# Patient Record
Sex: FEMALE | Race: WHITE | URBAN - METROPOLITAN AREA
[De-identification: names, ages, dates, MRNs, and addresses within clinical notes are randomized per-mention and may not be internally consistent; named-entity substitution may affect disease eponyms.]

---

## 2018-10-12 ENCOUNTER — APPOINTMENT (RX ONLY)
Dept: URBAN - METROPOLITAN AREA CLINIC 148 | Facility: CLINIC | Age: 78
Setting detail: DERMATOLOGY
End: 2018-10-12

## 2018-10-12 DIAGNOSIS — D22 MELANOCYTIC NEVI: ICD-10-CM

## 2018-10-12 DIAGNOSIS — D18.0 HEMANGIOMA: ICD-10-CM

## 2018-10-12 DIAGNOSIS — L81.4 OTHER MELANIN HYPERPIGMENTATION: ICD-10-CM

## 2018-10-12 DIAGNOSIS — B07.8 OTHER VIRAL WARTS: ICD-10-CM

## 2018-10-12 DIAGNOSIS — L28.1 PRURIGO NODULARIS: ICD-10-CM

## 2018-10-12 DIAGNOSIS — Z85.828 PERSONAL HISTORY OF OTHER MALIGNANT NEOPLASM OF SKIN: ICD-10-CM

## 2018-10-12 DIAGNOSIS — L57.0 ACTINIC KERATOSIS: ICD-10-CM

## 2018-10-12 DIAGNOSIS — D485 NEOPLASM OF UNCERTAIN BEHAVIOR OF SKIN: ICD-10-CM

## 2018-10-12 DIAGNOSIS — L82.1 OTHER SEBORRHEIC KERATOSIS: ICD-10-CM

## 2018-10-12 DIAGNOSIS — Z87.2 PERSONAL HISTORY OF DISEASES OF THE SKIN AND SUBCUTANEOUS TISSUE: ICD-10-CM

## 2018-10-12 PROBLEM — D18.01 HEMANGIOMA OF SKIN AND SUBCUTANEOUS TISSUE: Status: ACTIVE | Noted: 2018-10-12

## 2018-10-12 PROBLEM — D22.62 MELANOCYTIC NEVI OF LEFT UPPER LIMB, INCLUDING SHOULDER: Status: ACTIVE | Noted: 2018-10-12

## 2018-10-12 PROBLEM — E03.9 HYPOTHYROIDISM, UNSPECIFIED: Status: ACTIVE | Noted: 2018-10-12

## 2018-10-12 PROBLEM — E78.5 HYPERLIPIDEMIA, UNSPECIFIED: Status: ACTIVE | Noted: 2018-10-12

## 2018-10-12 PROBLEM — K21.9 GASTRO-ESOPHAGEAL REFLUX DISEASE WITHOUT ESOPHAGITIS: Status: ACTIVE | Noted: 2018-10-12

## 2018-10-12 PROBLEM — D48.5 NEOPLASM OF UNCERTAIN BEHAVIOR OF SKIN: Status: ACTIVE | Noted: 2018-10-12

## 2018-10-12 PROBLEM — I10 ESSENTIAL (PRIMARY) HYPERTENSION: Status: ACTIVE | Noted: 2018-10-12

## 2018-10-12 PROBLEM — M12.9 ARTHROPATHY, UNSPECIFIED: Status: ACTIVE | Noted: 2018-10-12

## 2018-10-12 PROBLEM — D22.61 MELANOCYTIC NEVI OF RIGHT UPPER LIMB, INCLUDING SHOULDER: Status: ACTIVE | Noted: 2018-10-12

## 2018-10-12 PROBLEM — F41.9 ANXIETY DISORDER, UNSPECIFIED: Status: ACTIVE | Noted: 2018-10-12

## 2018-10-12 PROBLEM — D22.5 MELANOCYTIC NEVI OF TRUNK: Status: ACTIVE | Noted: 2018-10-12

## 2018-10-12 PROCEDURE — 11100: CPT | Mod: 59

## 2018-10-12 PROCEDURE — 11101: CPT

## 2018-10-12 PROCEDURE — ? COUNSELING

## 2018-10-12 PROCEDURE — ? LIQUID NITROGEN

## 2018-10-12 PROCEDURE — ? SUNSCREEN RECOMMENDATIONS

## 2018-10-12 PROCEDURE — ? BIOPSY BY SHAVE METHOD

## 2018-10-12 PROCEDURE — ? PRESCRIPTION

## 2018-10-12 PROCEDURE — 17000 DESTRUCT PREMALG LESION: CPT

## 2018-10-12 PROCEDURE — 99214 OFFICE O/P EST MOD 30 MIN: CPT | Mod: 25

## 2018-10-12 RX ORDER — BETAMETHASONE DIPROPIONATE 0.5 MG/G
CREAM TOPICAL
Qty: 1 | Refills: 0 | Status: ERX | COMMUNITY
Start: 2018-10-12

## 2018-10-12 RX ADMIN — BETAMETHASONE DIPROPIONATE: 0.5 CREAM TOPICAL at 00:00

## 2018-10-12 ASSESSMENT — LOCATION DETAILED DESCRIPTION DERM
LOCATION DETAILED: RIGHT ANTERIOR DISTAL UPPER ARM
LOCATION DETAILED: RIGHT MID-UPPER BACK
LOCATION DETAILED: INFERIOR THORACIC SPINE
LOCATION DETAILED: LEFT PROXIMAL DORSAL FOREARM
LOCATION DETAILED: LEFT ANTERIOR LATERAL DISTAL UPPER ARM
LOCATION DETAILED: RIGHT PROXIMAL POSTERIOR UPPER ARM
LOCATION DETAILED: RIGHT SUPERIOR UPPER BACK
LOCATION DETAILED: LEFT ELBOW
LOCATION DETAILED: RIGHT ANTERIOR PROXIMAL UPPER ARM
LOCATION DETAILED: RIGHT PROXIMAL RADIAL DORSAL FOREARM
LOCATION DETAILED: RIGHT SUPERIOR MEDIAL UPPER BACK
LOCATION DETAILED: UPPER STERNUM
LOCATION DETAILED: LEFT PROXIMAL POSTERIOR UPPER ARM
LOCATION DETAILED: RIGHT PROXIMAL DORSAL FOREARM
LOCATION DETAILED: LEFT SUPERIOR UPPER BACK
LOCATION DETAILED: LEFT ANTERIOR PROXIMAL THIGH
LOCATION DETAILED: RIGHT INFERIOR UPPER BACK

## 2018-10-12 ASSESSMENT — LOCATION SIMPLE DESCRIPTION DERM
LOCATION SIMPLE: RIGHT POSTERIOR UPPER ARM
LOCATION SIMPLE: LEFT UPPER ARM
LOCATION SIMPLE: RIGHT UPPER BACK
LOCATION SIMPLE: LEFT FOREARM
LOCATION SIMPLE: RIGHT FOREARM
LOCATION SIMPLE: LEFT UPPER BACK
LOCATION SIMPLE: CHEST
LOCATION SIMPLE: UPPER BACK
LOCATION SIMPLE: RIGHT UPPER ARM
LOCATION SIMPLE: LEFT ELBOW
LOCATION SIMPLE: LEFT POSTERIOR UPPER ARM
LOCATION SIMPLE: LEFT THIGH

## 2018-10-12 ASSESSMENT — LOCATION ZONE DERM
LOCATION ZONE: TRUNK
LOCATION ZONE: ARM
LOCATION ZONE: LEG

## 2018-10-12 NOTE — PROCEDURE: BIOPSY BY SHAVE METHOD
Anesthesia Type: 1% lidocaine with epinephrine
Electrodesiccation Text: The wound bed was treated with electrodesiccation after the biopsy was performed.
Post-Care Instructions: I reviewed with the patient in detail post-care instructions. Patient is to keep the biopsy site dry overnight, and then apply vaseline with bandaid twice daily until healed.
Detail Level: Detailed
Biopsy Method: Dermablade
Size Of Lesion In Cm: 0
Destruction After The Procedure: No
Consent: Written consent was obtained and risks were reviewed including but not limited to scarring, infection, bleeding, scabbing, incomplete removal, nerve damage and allergy to anesthesia.
Silver Nitrate Text: The wound bed was treated with silver nitrate after the biopsy was performed.
Billing Type: Third-Party Bill
Biopsy Type: H and E
Curettage Text: The wound bed was treated with curettage after the biopsy was performed.
Electrodesiccation And Curettage Text: The wound bed was treated with electrodesiccation and curettage after the biopsy was performed.
Dressing: bandage
Notification Instructions: Patient will be notified of biopsy results. However, patient instructed to call the office if not contacted within 2 weeks.
Type Of Destruction Used: Curettage
Hemostasis: Aluminum Chloride
Wound Care: Vaseline
Lab: 6
Depth Of Biopsy: dermis
Was A Bandage Applied: Yes
Anesthesia Volume In Cc (Will Not Render If 0): 0.5
Cryotherapy Text: The wound bed was treated with cryotherapy after the biopsy was performed.

## 2018-10-12 NOTE — PROCEDURE: LIQUID NITROGEN
Consent: The patient's consent was obtained including but not limited to risks of crusting, scabbing, blistering, scarring, darker or lighter pigmentary change, recurrence, incomplete removal and infection.
Number Of Freeze-Thaw Cycles: 2 freeze-thaw cycles
Render Post-Care Instructions In Note?: no
Post-Care Instructions: I reviewed with the patient in detail post-care instructions. Patient is to wear sunprotection, and avoid picking at any of the treated lesions. Pt may apply Vaseline to crusted or scabbing areas.
Detail Level: Zone
Duration Of Freeze Thaw-Cycle (Seconds): 3

## 2018-10-12 NOTE — PROCEDURE: SUNSCREEN RECOMMENDATIONS
Detail Level: Detailed
Products Recommended: recommended Neutragena Baby Faces stick with titanium dioxide for the face and Vanicream products of the body
General Sunscreen Counseling: I recommended a broad spectrum sunscreen with a SPF of 30 or higher.  I explained that SPF 30 sunscreens block approximately 97 percent of the sun's harmful rays.  Sunscreens should be applied at least 15 minutes prior to expected sun exposure and then every 2 hours after that as long as sun exposure continues. If swimming or exercising sunscreen should be reapplied every 45 minutes to an hour after getting wet or sweating.  One ounce, or the equivalent of a shot glass full of sunscreen, is adequate to protect the skin not covered by a bathing suit. I also recommended a lip balm with a sunscreen as well. Sun protective clothing can be used in lieu of sunscreen but must be worn the entire time you are exposed to the sun's rays.

## 2018-11-02 ENCOUNTER — APPOINTMENT (RX ONLY)
Dept: URBAN - METROPOLITAN AREA CLINIC 148 | Facility: CLINIC | Age: 78
Setting detail: DERMATOLOGY
End: 2018-11-02

## 2018-11-02 DIAGNOSIS — L57.0 ACTINIC KERATOSIS: ICD-10-CM

## 2018-11-02 DIAGNOSIS — L81.4 OTHER MELANIN HYPERPIGMENTATION: ICD-10-CM

## 2018-11-02 PROBLEM — L55.1 SUNBURN OF SECOND DEGREE: Status: ACTIVE | Noted: 2018-11-02

## 2018-11-02 PROBLEM — L85.3 XEROSIS CUTIS: Status: ACTIVE | Noted: 2018-11-02

## 2018-11-02 PROCEDURE — 99213 OFFICE O/P EST LOW 20 MIN: CPT | Mod: 25

## 2018-11-02 PROCEDURE — ? COUNSELING

## 2018-11-02 PROCEDURE — ? LIQUID NITROGEN

## 2018-11-02 PROCEDURE — ? SUNSCREEN RECOMMENDATIONS

## 2018-11-02 PROCEDURE — 17004 DESTROY PREMAL LESIONS 15/>: CPT

## 2018-11-02 PROCEDURE — ? PATHOLOGY DISCUSSION

## 2018-11-02 ASSESSMENT — LOCATION DETAILED DESCRIPTION DERM
LOCATION DETAILED: RIGHT MEDIAL SUPERIOR CHEST
LOCATION DETAILED: RIGHT ANTERIOR PROXIMAL UPPER ARM
LOCATION DETAILED: LEFT POSTERIOR SHOULDER
LOCATION DETAILED: LEFT ANTERIOR LATERAL DISTAL UPPER ARM
LOCATION DETAILED: RIGHT INFERIOR LATERAL MALAR CHEEK
LOCATION DETAILED: RIGHT LATERAL TRAPEZIAL NECK
LOCATION DETAILED: LEFT ANTERIOR DISTAL UPPER ARM
LOCATION DETAILED: RIGHT DISTAL DORSAL FOREARM
LOCATION DETAILED: RIGHT ELBOW
LOCATION DETAILED: LEFT ANTERIOR SHOULDER
LOCATION DETAILED: RIGHT PROXIMAL DORSAL FOREARM
LOCATION DETAILED: RIGHT POSTERIOR SHOULDER
LOCATION DETAILED: LEFT MEDIAL SUPERIOR CHEST
LOCATION DETAILED: LEFT ELBOW
LOCATION DETAILED: LEFT LATERAL SUPERIOR CHEST
LOCATION DETAILED: LEFT VENTRAL PROXIMAL FOREARM
LOCATION DETAILED: LEFT ANTERIOR PROXIMAL THIGH
LOCATION DETAILED: RIGHT VENTRAL PROXIMAL FOREARM

## 2018-11-02 ASSESSMENT — LOCATION SIMPLE DESCRIPTION DERM
LOCATION SIMPLE: RIGHT FOREARM
LOCATION SIMPLE: RIGHT ELBOW
LOCATION SIMPLE: LEFT THIGH
LOCATION SIMPLE: LEFT ELBOW
LOCATION SIMPLE: LEFT UPPER ARM
LOCATION SIMPLE: LEFT FOREARM
LOCATION SIMPLE: LEFT SHOULDER
LOCATION SIMPLE: RIGHT UPPER ARM
LOCATION SIMPLE: POSTERIOR NECK
LOCATION SIMPLE: RIGHT SHOULDER
LOCATION SIMPLE: CHEST
LOCATION SIMPLE: RIGHT CHEEK

## 2018-11-02 ASSESSMENT — LOCATION ZONE DERM
LOCATION ZONE: FACE
LOCATION ZONE: LEG
LOCATION ZONE: ARM
LOCATION ZONE: TRUNK
LOCATION ZONE: NECK

## 2018-11-02 NOTE — PROCEDURE: LIQUID NITROGEN
Post-Care Instructions: I reviewed with the patient in detail post-care instructions. Patient is to wear sunprotection, and avoid picking at any of the treated lesions. Pt may apply Vaseline to crusted or scabbing areas.
Number Of Freeze-Thaw Cycles: 2 freeze-thaw cycles
Detail Level: Simple
Consent: The patient's consent was obtained including but not limited to risks of crusting, scabbing, blistering, scarring, darker or lighter pigmentary change, recurrence, incomplete removal and infection.
Duration Of Freeze Thaw-Cycle (Seconds): 2
Render Post-Care Instructions In Note?: no

## 2020-02-05 ENCOUNTER — APPOINTMENT (RX ONLY)
Dept: URBAN - METROPOLITAN AREA CLINIC 148 | Facility: CLINIC | Age: 80
Setting detail: DERMATOLOGY
End: 2020-02-05

## 2020-02-05 DIAGNOSIS — D22 MELANOCYTIC NEVI: ICD-10-CM

## 2020-02-05 DIAGNOSIS — B07.8 OTHER VIRAL WARTS: ICD-10-CM

## 2020-02-05 DIAGNOSIS — Z87.2 PERSONAL HISTORY OF DISEASES OF THE SKIN AND SUBCUTANEOUS TISSUE: ICD-10-CM

## 2020-02-05 DIAGNOSIS — L81.4 OTHER MELANIN HYPERPIGMENTATION: ICD-10-CM

## 2020-02-05 DIAGNOSIS — L57.0 ACTINIC KERATOSIS: ICD-10-CM

## 2020-02-05 DIAGNOSIS — L82.1 OTHER SEBORRHEIC KERATOSIS: ICD-10-CM

## 2020-02-05 DIAGNOSIS — L70.8 OTHER ACNE: ICD-10-CM

## 2020-02-05 DIAGNOSIS — D18.0 HEMANGIOMA: ICD-10-CM

## 2020-02-05 DIAGNOSIS — Z85.828 PERSONAL HISTORY OF OTHER MALIGNANT NEOPLASM OF SKIN: ICD-10-CM

## 2020-02-05 PROBLEM — D22.62 MELANOCYTIC NEVI OF LEFT UPPER LIMB, INCLUDING SHOULDER: Status: ACTIVE | Noted: 2020-02-05

## 2020-02-05 PROBLEM — D22.5 MELANOCYTIC NEVI OF TRUNK: Status: ACTIVE | Noted: 2020-02-05

## 2020-02-05 PROBLEM — D22.61 MELANOCYTIC NEVI OF RIGHT UPPER LIMB, INCLUDING SHOULDER: Status: ACTIVE | Noted: 2020-02-05

## 2020-02-05 PROBLEM — D18.01 HEMANGIOMA OF SKIN AND SUBCUTANEOUS TISSUE: Status: ACTIVE | Noted: 2020-02-05

## 2020-02-05 PROCEDURE — ? COUNSELING

## 2020-02-05 PROCEDURE — 17003 DESTRUCT PREMALG LES 2-14: CPT | Mod: 59

## 2020-02-05 PROCEDURE — ? PRESCRIPTION

## 2020-02-05 PROCEDURE — 99213 OFFICE O/P EST LOW 20 MIN: CPT | Mod: 25

## 2020-02-05 PROCEDURE — ? ORDER TESTS

## 2020-02-05 PROCEDURE — 17110 DESTRUCTION B9 LES UP TO 14: CPT

## 2020-02-05 PROCEDURE — 17000 DESTRUCT PREMALG LESION: CPT | Mod: 59

## 2020-02-05 PROCEDURE — ? SUNSCREEN RECOMMENDATIONS

## 2020-02-05 PROCEDURE — ? FULL BODY SKIN EXAM

## 2020-02-05 PROCEDURE — ? LIQUID NITROGEN

## 2020-02-05 RX ORDER — MUPIROCIN 20 MG/G
OINTMENT TOPICAL
Qty: 1 | Refills: 0 | Status: ERX | COMMUNITY
Start: 2020-02-05

## 2020-02-05 RX ADMIN — MUPIROCIN: 20 OINTMENT TOPICAL at 00:00

## 2020-02-05 ASSESSMENT — LOCATION DETAILED DESCRIPTION DERM
LOCATION DETAILED: RIGHT CENTRAL MALAR CHEEK
LOCATION DETAILED: LEFT PROXIMAL DORSAL FOREARM
LOCATION DETAILED: RIGHT RADIAL DORSAL HAND
LOCATION DETAILED: RIGHT SUPERIOR UPPER BACK
LOCATION DETAILED: LEFT DISTAL DORSAL FOREARM
LOCATION DETAILED: INFERIOR THORACIC SPINE
LOCATION DETAILED: RIGHT MID-UPPER BACK
LOCATION DETAILED: LEFT ULNAR DORSAL HAND
LOCATION DETAILED: RIGHT PROXIMAL RADIAL DORSAL FOREARM
LOCATION DETAILED: LEFT PROXIMAL PRETIBIAL REGION
LOCATION DETAILED: RIGHT INFERIOR MEDIAL MALAR CHEEK
LOCATION DETAILED: RIGHT PROXIMAL DORSAL FOREARM
LOCATION DETAILED: RIGHT SUPERIOR MEDIAL UPPER BACK
LOCATION DETAILED: LEFT ANTERIOR SHOULDER
LOCATION DETAILED: LEFT ELBOW
LOCATION DETAILED: RIGHT DORSAL RING METACARPOPHALANGEAL JOINT
LOCATION DETAILED: LEFT ANTERIOR LATERAL PROXIMAL UPPER ARM
LOCATION DETAILED: LEFT MEDIAL BREAST 8-9:00 REGION
LOCATION DETAILED: RIGHT INFERIOR UPPER BACK
LOCATION DETAILED: LEFT DISTAL POSTERIOR UPPER ARM
LOCATION DETAILED: RIGHT PROXIMAL LATERAL POSTERIOR THIGH
LOCATION DETAILED: LEFT SUPERIOR UPPER BACK
LOCATION DETAILED: RIGHT DISTAL PRETIBIAL REGION

## 2020-02-05 ASSESSMENT — LOCATION SIMPLE DESCRIPTION DERM
LOCATION SIMPLE: LEFT PRETIBIAL REGION
LOCATION SIMPLE: LEFT ELBOW
LOCATION SIMPLE: LEFT UPPER ARM
LOCATION SIMPLE: RIGHT FOREARM
LOCATION SIMPLE: LEFT BREAST
LOCATION SIMPLE: RIGHT POSTERIOR THIGH
LOCATION SIMPLE: RIGHT HAND
LOCATION SIMPLE: LEFT FOREARM
LOCATION SIMPLE: LEFT POSTERIOR UPPER ARM
LOCATION SIMPLE: UPPER BACK
LOCATION SIMPLE: RIGHT PRETIBIAL REGION
LOCATION SIMPLE: LEFT SHOULDER
LOCATION SIMPLE: RIGHT UPPER BACK
LOCATION SIMPLE: LEFT UPPER BACK
LOCATION SIMPLE: LEFT HAND
LOCATION SIMPLE: RIGHT CHEEK

## 2020-02-05 ASSESSMENT — LOCATION ZONE DERM
LOCATION ZONE: TRUNK
LOCATION ZONE: FACE
LOCATION ZONE: LEG
LOCATION ZONE: HAND
LOCATION ZONE: ARM

## 2020-02-05 NOTE — PROCEDURE: LIQUID NITROGEN
Detail Level: Zone
Render Note In Bullet Format When Appropriate: No
Number Of Freeze-Thaw Cycles: 2 freeze-thaw cycles
Post-Care Instructions: I reviewed with the patient in detail post-care instructions. Patient is to wear sunprotection, and avoid picking at any of the treated lesions. Pt may apply Vaseline to crusted or scabbing areas.
Consent: The patient's consent was obtained including but not limited to risks of crusting, scabbing, blistering, scarring, darker or lighter pigmentary change, recurrence, incomplete removal and infection.
Duration Of Freeze Thaw-Cycle (Seconds): 3
Medical Necessity Clause: This procedure was medically necessary because the lesions that were treated were: at risk for and/or subject to recurrent physical trauma as a result of lesion type and location with history of the same, bleeding and irritated.
Medical Necessity Information: It is in your best interest to select a reason for this procedure from the list below. All of these items fulfill various CMS LCD requirements except the new and changing color options.
Detail Level: Detailed

## 2020-08-28 ENCOUNTER — APPOINTMENT (RX ONLY)
Dept: URBAN - METROPOLITAN AREA CLINIC 148 | Facility: CLINIC | Age: 80
Setting detail: DERMATOLOGY
End: 2020-08-28

## 2020-08-28 VITALS — TEMPERATURE: 97.3 F

## 2020-08-28 DIAGNOSIS — L29.8 OTHER PRURITUS: ICD-10-CM

## 2020-08-28 DIAGNOSIS — L29.89 OTHER PRURITUS: ICD-10-CM

## 2020-08-28 DIAGNOSIS — L98.1 FACTITIAL DERMATITIS: ICD-10-CM

## 2020-08-28 DIAGNOSIS — L08.0 PYODERMA: ICD-10-CM

## 2020-08-28 PROCEDURE — ? ADDITIONAL NOTES

## 2020-08-28 PROCEDURE — ? COUNSELING

## 2020-08-28 PROCEDURE — ? PRESCRIPTION

## 2020-08-28 PROCEDURE — ? FULL BODY SKIN EXAM - DECLINED

## 2020-08-28 PROCEDURE — 99213 OFFICE O/P EST LOW 20 MIN: CPT

## 2020-08-28 PROCEDURE — ? TREATMENT REGIMEN

## 2020-08-28 RX ORDER — MUPIROCIN 20 MG/G
OINTMENT TOPICAL
Qty: 1 | Refills: 1 | Status: ERX

## 2020-08-28 RX ORDER — TRIAMCINOLONE ACETONIDE 1 MG/G
CREAM TOPICAL
Qty: 1 | Refills: 0 | Status: ERX | COMMUNITY
Start: 2020-08-28

## 2020-08-28 RX ORDER — FLUOCINONIDE 0.5 MG/ML
SOLUTION TOPICAL
Qty: 1 | Refills: 1 | Status: ERX | COMMUNITY
Start: 2020-08-28

## 2020-08-28 RX ADMIN — TRIAMCINOLONE ACETONIDE: 1 CREAM TOPICAL at 00:00

## 2020-08-28 RX ADMIN — FLUOCINONIDE: 0.5 SOLUTION TOPICAL at 00:00

## 2020-08-28 ASSESSMENT — LOCATION SIMPLE DESCRIPTION DERM
LOCATION SIMPLE: UPPER BACK
LOCATION SIMPLE: LEFT CLAVICULAR SKIN
LOCATION SIMPLE: RIGHT UPPER BACK
LOCATION SIMPLE: RIGHT THIGH
LOCATION SIMPLE: LEFT UPPER BACK
LOCATION SIMPLE: SCALP

## 2020-08-28 ASSESSMENT — LOCATION DETAILED DESCRIPTION DERM
LOCATION DETAILED: RIGHT SUPERIOR UPPER BACK
LOCATION DETAILED: RIGHT ANTERIOR DISTAL THIGH
LOCATION DETAILED: RIGHT SUPERIOR PARIETAL SCALP
LOCATION DETAILED: LEFT CLAVICULAR SKIN
LOCATION DETAILED: LEFT SUPERIOR UPPER BACK
LOCATION DETAILED: SUPERIOR THORACIC SPINE

## 2020-08-28 ASSESSMENT — LOCATION ZONE DERM
LOCATION ZONE: TRUNK
LOCATION ZONE: LEG
LOCATION ZONE: SCALP

## 2021-11-01 ENCOUNTER — OFFICE VISIT (OUTPATIENT)
Age: 81
End: 2021-11-01

## 2022-02-01 ENCOUNTER — OFFICE VISIT (OUTPATIENT)
Dept: URBAN - METROPOLITAN AREA CLINIC 7 | Facility: CLINIC | Age: 82
End: 2022-02-01

## 2022-02-01 ENCOUNTER — OFFICE VISIT (OUTPATIENT)
Dept: URBAN - METROPOLITAN AREA CLINIC 9 | Facility: CLINIC | Age: 82
End: 2022-02-01

## 2022-02-04 ENCOUNTER — TELEPHONE ENCOUNTER (OUTPATIENT)
Dept: URBAN - METROPOLITAN AREA CLINIC 9 | Facility: CLINIC | Age: 82
End: 2022-02-04

## 2022-03-01 ENCOUNTER — OFFICE VISIT (OUTPATIENT)
Dept: URBAN - METROPOLITAN AREA CLINIC 7 | Facility: CLINIC | Age: 82
End: 2022-03-01

## 2022-03-15 ENCOUNTER — OFFICE VISIT (OUTPATIENT)
Dept: URBAN - METROPOLITAN AREA CLINIC 7 | Facility: CLINIC | Age: 82
End: 2022-03-15

## 2022-05-02 ENCOUNTER — OFFICE VISIT (OUTPATIENT)
Dept: URBAN - METROPOLITAN AREA CLINIC 7 | Facility: CLINIC | Age: 82
End: 2022-05-02

## 2022-05-13 ENCOUNTER — TELEPHONE ENCOUNTER (OUTPATIENT)
Dept: URBAN - METROPOLITAN AREA CLINIC 9 | Facility: CLINIC | Age: 82
End: 2022-05-13

## 2022-07-09 ENCOUNTER — TELEPHONE ENCOUNTER (OUTPATIENT)
Dept: URBAN - METROPOLITAN AREA CLINIC 121 | Facility: CLINIC | Age: 82
End: 2022-07-09

## 2022-07-10 ENCOUNTER — TELEPHONE ENCOUNTER (OUTPATIENT)
Dept: URBAN - METROPOLITAN AREA CLINIC 121 | Facility: CLINIC | Age: 82
End: 2022-07-10

## 2022-07-30 ENCOUNTER — TELEPHONE ENCOUNTER (OUTPATIENT)
Age: 82
End: 2022-07-30

## 2022-07-30 RX ORDER — WHEAT DEXTRIN 3 G/4 G
1 (ONE) POWDER (GRAM) ORAL DAILY
Qty: 0 | Refills: 2 | OUTPATIENT
Start: 2018-02-28 | End: 2018-03-30

## 2022-07-30 RX ORDER — FAMOTIDINE 10 MG
1 (ONE) TABLET ORAL
Qty: 0 | Refills: 16 | OUTPATIENT
Start: 2018-05-29 | End: 2018-05-29

## 2022-07-30 RX ORDER — OMEPRAZOLE 20 MG/1
1 CAPSULE, DELAYED RELEASE ORAL DAILY
Qty: 0 | Refills: 2 | OUTPATIENT
Start: 2018-01-23 | End: 2018-02-02

## 2022-07-30 RX ORDER — BIFIDOBACTERIUM LONGUM 10MM CELL
1 (ONE) CAPSULE ORAL
Qty: 0 | Refills: 16 | OUTPATIENT
Start: 2018-05-29 | End: 2022-03-01

## 2022-07-30 RX ORDER — WHEAT DEXTRIN 3 G/4 G
1 (ONE) POWDER (GRAM) ORAL DAILY
Qty: 0 | Refills: 2 | OUTPATIENT
Start: 2018-05-29 | End: 2018-06-28

## 2022-07-30 RX ORDER — BIFIDOBACTERIUM LONGUM 10MM CELL
1 (ONE) CAPSULE ORAL
Qty: 0 | Refills: 16 | OUTPATIENT
Start: 2018-05-14 | End: 2018-05-29

## 2022-07-30 RX ORDER — OMEPRAZOLE 40 MG/1
1 (ONE) CAPSULE, DELAYED RELEASE ORAL
Qty: 0 | Refills: 16 | OUTPATIENT
Start: 2018-05-29 | End: 2022-03-01

## 2022-07-31 ENCOUNTER — TELEPHONE ENCOUNTER (OUTPATIENT)
Age: 82
End: 2022-07-31

## 2022-07-31 RX ORDER — WHEAT DEXTRIN 3 G/4 G
1 (ONE) POWDER (GRAM) ORAL DAILY
Qty: 0 | Refills: 2 | Status: ACTIVE | COMMUNITY
Start: 2018-05-29

## 2022-07-31 RX ORDER — LORATADINE 5 MG
17 GRAMS TABLET,CHEWABLE ORAL
Qty: 0 | Refills: 16 | Status: ACTIVE | COMMUNITY
Start: 2018-05-29

## 2022-07-31 RX ORDER — BIFIDOBACTERIUM LONGUM 10MM CELL
1 (ONE) CAPSULE ORAL
Qty: 0 | Refills: 16 | Status: ACTIVE | COMMUNITY
Start: 2018-05-29

## 2022-07-31 RX ORDER — LORATADINE 5 MG
17 GRAMS TABLET,CHEWABLE ORAL DAILY
Qty: 0 | Refills: 16 | Status: ACTIVE | COMMUNITY
Start: 2018-05-14

## 2022-07-31 RX ORDER — WHEAT DEXTRIN 3 G/4 G
1 (ONE) POWDER (GRAM) ORAL DAILY
Qty: 0 | Refills: 2 | Status: ACTIVE | COMMUNITY
Start: 2018-02-28

## 2022-07-31 RX ORDER — OMEPRAZOLE 20 MG/1
1 CAPSULE, DELAYED RELEASE ORAL DAILY
Qty: 0 | Refills: 2 | Status: ACTIVE | COMMUNITY
Start: 2018-01-23

## 2022-07-31 RX ORDER — FAMOTIDINE 10 MG
1 (ONE) TABLET ORAL
Qty: 0 | Refills: 16 | Status: ACTIVE | COMMUNITY
Start: 2018-05-14

## 2022-07-31 RX ORDER — FAMOTIDINE 10 MG
1 (ONE) TABLET ORAL
Qty: 0 | Refills: 16 | Status: ACTIVE | COMMUNITY
Start: 2018-02-02

## 2022-07-31 RX ORDER — FAMOTIDINE 10 MG
1 (ONE) TABLET ORAL
Qty: 0 | Refills: 16 | Status: ACTIVE | COMMUNITY
Start: 2018-02-28

## 2022-07-31 RX ORDER — OMEPRAZOLE 40 MG/1
1 (ONE) CAPSULE, DELAYED RELEASE ORAL
Qty: 0 | Refills: 16 | Status: ACTIVE | COMMUNITY
Start: 2018-05-29

## 2022-07-31 RX ORDER — LINACLOTIDE 72 UG/1
1 (ONE) CAPSULE, GELATIN COATED ORAL AS DIRECTED
Qty: 0 | Refills: 5 | Status: ACTIVE | COMMUNITY
Start: 2022-05-02

## 2022-07-31 RX ORDER — LORATADINE 5 MG
17 GRAMS TABLET,CHEWABLE ORAL DAILY
Qty: 0 | Refills: 16 | Status: ACTIVE | COMMUNITY
Start: 2018-02-28

## 2022-07-31 RX ORDER — LORATADINE 5 MG
17 GRAMS POWDER TABLET,CHEWABLE ORAL TWICE A DAY
Qty: 0 | Refills: 16 | Status: ACTIVE | COMMUNITY
Start: 2018-01-23

## 2022-07-31 RX ORDER — BIFIDOBACTERIUM LONGUM 10MM CELL
1 (ONE) CAPSULE CAPSULE ORAL
Qty: 0 | Refills: 16 | Status: ACTIVE | COMMUNITY
Start: 2018-02-28

## 2022-07-31 RX ORDER — BIFIDOBACTERIUM LONGUM 10MM CELL
1 (ONE) CAPSULE ORAL
Qty: 0 | Refills: 16 | Status: ACTIVE | COMMUNITY
Start: 2018-05-14

## 2023-01-27 ENCOUNTER — TELEPHONE ENCOUNTER (OUTPATIENT)
Dept: URBAN - METROPOLITAN AREA CLINIC 8 | Facility: CLINIC | Age: 83
End: 2023-01-27

## 2023-01-27 VITALS — BODY MASS INDEX: 26.82 KG/M2 | WEIGHT: 161 LBS | HEIGHT: 65 IN

## 2023-01-27 RX ORDER — HYDROCHLOROTHIAZIDE 25 MG/1
TABLET ORAL
Qty: 90 TABLET | Status: ACTIVE | COMMUNITY

## 2023-01-27 RX ORDER — FAMOTIDINE 10 MG
1 (ONE) TABLET ORAL
Qty: 0 | Refills: 16 | Status: ON HOLD | COMMUNITY
Start: 2018-02-28

## 2023-01-27 RX ORDER — CLOPIDOGREL 75 MG/1
TAKE ONE TABLET BY MOUTH ONE TIME DAILY TABLET, FILM COATED ORAL
Qty: 90 UNSPECIFIED | Refills: 0 | Status: ACTIVE | COMMUNITY

## 2023-01-27 RX ORDER — LORATADINE 5 MG
17 GRAMS TABLET,CHEWABLE ORAL DAILY
Qty: 0 | Refills: 16 | Status: ON HOLD | COMMUNITY
Start: 2018-02-28

## 2023-01-27 RX ORDER — LINACLOTIDE 72 UG/1
1 (ONE) CAPSULE, GELATIN COATED ORAL AS DIRECTED
Qty: 0 | Refills: 5 | Status: ON HOLD | COMMUNITY
Start: 2022-05-02

## 2023-01-27 RX ORDER — BIFIDOBACTERIUM LONGUM 10MM CELL
1 (ONE) CAPSULE ORAL
Qty: 0 | Refills: 16 | Status: ON HOLD | COMMUNITY
Start: 2018-05-14

## 2023-01-27 RX ORDER — FAMOTIDINE 20 MG/1
TAKE ONE TABLET BY MOUTH TWICE A DAY TABLET, FILM COATED ORAL
Qty: 180 UNSPECIFIED | Refills: 1 | Status: ACTIVE | COMMUNITY

## 2023-01-27 RX ORDER — PROPRANOLOL HYDROCHLORIDE 60 MG/1
TAKE ONE TABLET BY MOUTH ONE TIME DAILY TABLET ORAL
Qty: 90 UNSPECIFIED | Refills: 1 | Status: ACTIVE | COMMUNITY

## 2023-01-27 RX ORDER — ATORVASTATIN CALCIUM 20 MG/1
TABLET, FILM COATED ORAL
Qty: 90 TABLET | Status: ACTIVE | COMMUNITY

## 2023-01-27 RX ORDER — OMEPRAZOLE 20 MG/1
1 CAPSULE, DELAYED RELEASE ORAL DAILY
Qty: 0 | Refills: 2 | Status: ON HOLD | COMMUNITY
Start: 2018-01-23

## 2023-01-27 RX ORDER — LOSARTAN POTASSIUM 100 MG/1
TAKE ONE TABLET BY MOUTH ONE TIME DAILY TABLET, FILM COATED ORAL
Qty: 90 UNSPECIFIED | Refills: 0 | Status: ACTIVE | COMMUNITY

## 2023-01-27 RX ORDER — OMEPRAZOLE 40 MG/1
1 (ONE) CAPSULE, DELAYED RELEASE ORAL
Qty: 0 | Refills: 16 | Status: ON HOLD | COMMUNITY
Start: 2018-05-29

## 2023-01-27 RX ORDER — WHEAT DEXTRIN 3 G/4 G
1 (ONE) POWDER (GRAM) ORAL DAILY
Qty: 0 | Refills: 2 | Status: ON HOLD | COMMUNITY
Start: 2018-02-28

## 2023-01-27 RX ORDER — ALPRAZOLAM 0.5 MG/1
TABLET ORAL
Qty: 60 TABLET | Status: ACTIVE | COMMUNITY

## 2023-01-27 RX ORDER — DOXYCYCLINE HYCLATE 100 MG/1
TAKE ONE CAPSULE BY MOUTH TWICE A DAY FOR 10 DAYS CAPSULE ORAL
Qty: 20 UNSPECIFIED | Refills: 0 | Status: ON HOLD | COMMUNITY

## 2023-01-27 RX ORDER — LEVOTHYROXINE SODIUM 50 UG/1
TABLET ORAL
Qty: 90 TABLET | Status: ACTIVE | COMMUNITY

## 2023-01-27 RX ORDER — ESCITALOPRAM OXALATE 10 MG/1
TAKE ONE AND ONE-HALF TABLETS BY MOUTH ONE TIME DAILY TABLET ORAL
Qty: 135 UNSPECIFIED | Refills: 2 | Status: ACTIVE | COMMUNITY

## 2023-02-15 ENCOUNTER — OFFICE VISIT (OUTPATIENT)
Dept: URBAN - METROPOLITAN AREA CLINIC 7 | Facility: CLINIC | Age: 83
End: 2023-02-15
Payer: MEDICARE

## 2023-02-15 VITALS
BODY MASS INDEX: 26.49 KG/M2 | HEIGHT: 65 IN | WEIGHT: 159 LBS | DIASTOLIC BLOOD PRESSURE: 80 MMHG | RESPIRATION RATE: 16 BRPM | SYSTOLIC BLOOD PRESSURE: 130 MMHG | TEMPERATURE: 97.6 F

## 2023-02-15 DIAGNOSIS — Z86.010 HISTORY OF COLON POLYPS: ICD-10-CM

## 2023-02-15 DIAGNOSIS — K59.00 CONSTIPATION: ICD-10-CM

## 2023-02-15 DIAGNOSIS — E03.9 HYPOTHYROIDISM, UNSPECIFIED TYPE: ICD-10-CM

## 2023-02-15 DIAGNOSIS — M62.89 PELVIC FLOOR DYSFUNCTION: ICD-10-CM

## 2023-02-15 DIAGNOSIS — R15.0 INCOMPLETE DEFECATION: ICD-10-CM

## 2023-02-15 DIAGNOSIS — K58.1 IRRITABLE BOWEL SYNDROME WITH CONSTIPATION: ICD-10-CM

## 2023-02-15 DIAGNOSIS — R10.9 ABDOMINAL PAIN: ICD-10-CM

## 2023-02-15 DIAGNOSIS — K21.9 GASTROESOPHAGEAL REFLUX DISEASE: ICD-10-CM

## 2023-02-15 DIAGNOSIS — N81.10 FEMALE CYSTOCELE: ICD-10-CM

## 2023-02-15 PROCEDURE — 99214 OFFICE O/P EST MOD 30 MIN: CPT | Performed by: INTERNAL MEDICINE

## 2023-02-15 RX ORDER — SODIUM PICOSULFATE, MAGNESIUM OXIDE, AND ANHYDROUS CITRIC ACID 10; 3.5; 12 MG/160ML; G/160ML; G/160ML
160ML LIQUID ORAL
Qty: 1 KIT | Refills: 0 | OUTPATIENT
Start: 2023-02-15 | End: 2023-02-16

## 2023-02-15 RX ORDER — LORATADINE 5 MG
17 GRAMS TABLET,CHEWABLE ORAL DAILY
Qty: 0 | Refills: 16 | Status: ON HOLD | COMMUNITY
Start: 2018-02-28

## 2023-02-15 RX ORDER — LOSARTAN POTASSIUM 100 MG/1
TAKE ONE TABLET BY MOUTH ONE TIME DAILY TABLET, FILM COATED ORAL
Qty: 90 UNSPECIFIED | Refills: 0 | Status: ACTIVE | COMMUNITY

## 2023-02-15 RX ORDER — PROPRANOLOL HYDROCHLORIDE 60 MG/1
TAKE ONE TABLET BY MOUTH ONE TIME DAILY TABLET ORAL
Qty: 90 UNSPECIFIED | Refills: 1 | Status: ACTIVE | COMMUNITY

## 2023-02-15 RX ORDER — OMEPRAZOLE 40 MG/1
1 (ONE) CAPSULE, DELAYED RELEASE ORAL
Qty: 0 | Refills: 16 | Status: ON HOLD | COMMUNITY
Start: 2018-05-29

## 2023-02-15 RX ORDER — HYDROCHLOROTHIAZIDE 25 MG/1
TABLET ORAL
Qty: 90 TABLET | Status: ACTIVE | COMMUNITY

## 2023-02-15 RX ORDER — ESCITALOPRAM OXALATE 10 MG/1
TAKE ONE AND ONE-HALF TABLETS BY MOUTH ONE TIME DAILY TABLET ORAL
Qty: 135 UNSPECIFIED | Refills: 2 | Status: ACTIVE | COMMUNITY

## 2023-02-15 RX ORDER — OMEPRAZOLE 20 MG/1
1 CAPSULE, DELAYED RELEASE ORAL DAILY
Qty: 0 | Refills: 2 | Status: ON HOLD | COMMUNITY
Start: 2018-01-23

## 2023-02-15 RX ORDER — LEVOTHYROXINE SODIUM 50 UG/1
TABLET ORAL
Qty: 90 TABLET | Status: ACTIVE | COMMUNITY

## 2023-02-15 RX ORDER — DOXYCYCLINE HYCLATE 100 MG/1
TAKE ONE CAPSULE BY MOUTH TWICE A DAY FOR 10 DAYS CAPSULE ORAL
Qty: 20 UNSPECIFIED | Refills: 0 | Status: ON HOLD | COMMUNITY

## 2023-02-15 RX ORDER — WHEAT DEXTRIN 3 G/4 G
1 (ONE) POWDER (GRAM) ORAL DAILY
Qty: 0 | Refills: 2 | Status: ON HOLD | COMMUNITY
Start: 2018-02-28

## 2023-02-15 RX ORDER — CLOPIDOGREL 75 MG/1
TAKE ONE TABLET BY MOUTH ONE TIME DAILY TABLET, FILM COATED ORAL
Qty: 90 UNSPECIFIED | Refills: 0 | Status: ACTIVE | COMMUNITY

## 2023-02-15 RX ORDER — PLECANATIDE 3 MG/1
1 TABLET TABLET ORAL ONCE A DAY
Qty: 30 | Refills: 3 | OUTPATIENT
Start: 2023-02-15 | End: 2023-06-15

## 2023-02-15 RX ORDER — FAMOTIDINE 20 MG/1
TAKE ONE TABLET BY MOUTH TWICE A DAY TABLET, FILM COATED ORAL
Qty: 180 UNSPECIFIED | Refills: 1 | Status: ACTIVE | COMMUNITY

## 2023-02-15 RX ORDER — ATORVASTATIN CALCIUM 20 MG/1
TABLET, FILM COATED ORAL
Qty: 90 TABLET | Status: ACTIVE | COMMUNITY

## 2023-02-15 RX ORDER — ALPRAZOLAM 0.5 MG/1
TABLET ORAL
Qty: 60 TABLET | Status: ACTIVE | COMMUNITY

## 2023-02-15 RX ORDER — LINACLOTIDE 72 UG/1
1 (ONE) CAPSULE, GELATIN COATED ORAL AS DIRECTED
Qty: 0 | Refills: 5 | Status: ON HOLD | COMMUNITY
Start: 2022-05-02

## 2023-02-15 RX ORDER — FAMOTIDINE 10 MG
1 (ONE) TABLET ORAL
Qty: 0 | Refills: 16 | Status: ON HOLD | COMMUNITY
Start: 2018-02-28

## 2023-02-15 RX ORDER — BIFIDOBACTERIUM LONGUM 10MM CELL
1 (ONE) CAPSULE ORAL
Qty: 0 | Refills: 16 | Status: ON HOLD | COMMUNITY
Start: 2018-05-14

## 2023-02-15 NOTE — HPI-SCREENING
LV 4/2022. Eval has been for constipation, bloating, and abdominal discomfort. UGI series in 2018 demonstrated a small hiatal hernia with mild gastroesophageal reflux.  No motility disorder was seen.  She was evaluated for dysphagia back in 2018 and had a manometry which was read as normal with an IRP of 9 and normal DCI.  She also had an endoscopy at that time with biopsies that were negative. CT scan only demonstrated diverticulosis and constipation.  She also had a colonoscopy in 2018 which demonstrate a 1 tubular adenoma. Normal hemoglobin and creatinine October 2021. She is on Plavix. Sensation of the need to go, but unable to go. Usually takes Prunes and Benefiber. Has carotid stent in place (10/2021). Rectal exam with small skin tags, no external hemorrhoids, no blood on glove, brown stool, no pain with digital exam, no hard stool in the rectal vault. CT without bowel inflammation, no LAD normal pancreas, pleural based nodule, small renal cysts. Advised PCP follow up of lung nodule. CBC and iron normal. Doing Miralax and Benefiber daily (once in AM), but still feels that she can't fully eliminate, smaller stools, incomplete defecation. Feels like she has to do manual evacuation at times. No alarm symptoms. MR defecography 4/2022 with incomplete defecation (more than 75% gel left behind), paradoxical puborectalis contraction, moderate cystocele. Reviewed her MRI results with her. Continues to use fiber and Miralax and sometimes uses manual evacuation. Still gets lower abdominal cramping. Weight is largely stable around 160 lbs. Does feel pelvic pressure at times and has to use digital manipulation to evacuate. Doing Miralax/Benefiber twice daily, pebble stools if she does not take these.  Given pelvic floor dyssynergia, referred to PFPT, as well as to Dalila Chapa for cystocele, continue fiber BID, Miralax BID. Tried Linzess 72 mcg every other day to see if this stimulates her a bit to go.  She continues to have constipation issues, which is chronic. Taking miralax, benefiber, prunes. No bleeding. Still having bloating and very similar symptoms to prior. Still has some rectal stimulation to help her have BM's. She is not sure if she saw Dr. Chapa for her cystocele. Linzess made her go very quickly, but too urgently.

## 2023-02-17 PROBLEM — 428283002: Status: ACTIVE | Noted: 2023-02-14

## 2023-02-22 ENCOUNTER — LAB OUTSIDE AN ENCOUNTER (OUTPATIENT)
Dept: URBAN - METROPOLITAN AREA CLINIC 7 | Facility: CLINIC | Age: 83
End: 2023-02-22

## 2023-03-03 ENCOUNTER — TELEPHONE ENCOUNTER (OUTPATIENT)
Dept: URBAN - METROPOLITAN AREA CLINIC 8 | Facility: CLINIC | Age: 83
End: 2023-03-03

## 2023-03-15 ENCOUNTER — OFFICE VISIT (OUTPATIENT)
Dept: URBAN - METROPOLITAN AREA SURGERY CENTER 5 | Facility: SURGERY CENTER | Age: 83
End: 2023-03-15

## 2023-04-05 ENCOUNTER — TELEPHONE ENCOUNTER (OUTPATIENT)
Dept: URBAN - METROPOLITAN AREA CLINIC 7 | Facility: CLINIC | Age: 83
End: 2023-04-05

## 2023-04-18 ENCOUNTER — LAB OUTSIDE AN ENCOUNTER (OUTPATIENT)
Dept: URBAN - METROPOLITAN AREA SURGERY CENTER 5 | Facility: SURGERY CENTER | Age: 83
End: 2023-04-18

## 2023-04-20 ENCOUNTER — CLAIMS CREATED FROM THE CLAIM WINDOW (OUTPATIENT)
Dept: URBAN - METROPOLITAN AREA CLINIC 4 | Facility: CLINIC | Age: 83
End: 2023-04-20
Payer: MEDICARE

## 2023-04-20 ENCOUNTER — CLAIMS CREATED FROM THE CLAIM WINDOW (OUTPATIENT)
Dept: URBAN - METROPOLITAN AREA SURGERY CENTER 5 | Facility: SURGERY CENTER | Age: 83
End: 2023-04-20
Payer: MEDICARE

## 2023-04-20 ENCOUNTER — TELEPHONE ENCOUNTER (OUTPATIENT)
Dept: URBAN - METROPOLITAN AREA CLINIC 7 | Facility: CLINIC | Age: 83
End: 2023-04-20

## 2023-04-20 DIAGNOSIS — K63.89 APPENDICITIS EPIPLOICA: ICD-10-CM

## 2023-04-20 DIAGNOSIS — R13.10 ABNORMAL DEGLUTITION: ICD-10-CM

## 2023-04-20 DIAGNOSIS — K57.30 ACQUIRED DIVERTICULOSIS OF COLON: ICD-10-CM

## 2023-04-20 DIAGNOSIS — K31.89 ACQUIRED DEFORMITY OF DUODENUM: ICD-10-CM

## 2023-04-20 DIAGNOSIS — K20.90 ACUTE ESOPHAGITIS: ICD-10-CM

## 2023-04-20 DIAGNOSIS — K64.8 EXTERNAL HEMORRHOIDS: ICD-10-CM

## 2023-04-20 DIAGNOSIS — Z86.010 ADENOMAS PERSONAL HISTORY OF COLONIC POLYPS: ICD-10-CM

## 2023-04-20 DIAGNOSIS — K62.89 ACUTE PROCTITIS: ICD-10-CM

## 2023-04-20 DIAGNOSIS — K31.89 OTHER DISEASES OF STOMACH AND DUODENUM: ICD-10-CM

## 2023-04-20 PROCEDURE — 43248 EGD GUIDE WIRE INSERTION: CPT | Performed by: INTERNAL MEDICINE

## 2023-04-20 PROCEDURE — 45385 COLONOSCOPY W/LESION REMOVAL: CPT | Performed by: INTERNAL MEDICINE

## 2023-04-20 PROCEDURE — 45385 COLONOSCOPY W/LESION REMOVAL: CPT | Performed by: CLINIC/CENTER

## 2023-04-20 PROCEDURE — 43239 EGD BIOPSY SINGLE/MULTIPLE: CPT | Performed by: CLINIC/CENTER

## 2023-04-20 PROCEDURE — 43248 EGD GUIDE WIRE INSERTION: CPT | Performed by: CLINIC/CENTER

## 2023-04-20 PROCEDURE — 43239 EGD BIOPSY SINGLE/MULTIPLE: CPT | Performed by: INTERNAL MEDICINE

## 2023-04-20 PROCEDURE — 88312 SPECIAL STAINS GROUP 1: CPT | Performed by: PATHOLOGY

## 2023-04-20 PROCEDURE — 88305 TISSUE EXAM BY PATHOLOGIST: CPT | Performed by: PATHOLOGY

## 2023-04-20 RX ORDER — BIFIDOBACTERIUM LONGUM 10MM CELL
1 (ONE) CAPSULE ORAL
Qty: 0 | Refills: 16 | Status: ON HOLD | COMMUNITY
Start: 2018-05-14

## 2023-04-20 RX ORDER — PROPRANOLOL HYDROCHLORIDE 60 MG/1
TAKE ONE TABLET BY MOUTH ONE TIME DAILY TABLET ORAL
Qty: 90 UNSPECIFIED | Refills: 1 | Status: ACTIVE | COMMUNITY

## 2023-04-20 RX ORDER — ALPRAZOLAM 0.5 MG/1
TABLET ORAL
Qty: 60 TABLET | Status: ACTIVE | COMMUNITY

## 2023-04-20 RX ORDER — OMEPRAZOLE 40 MG/1
1 TABLET CAPSULE, DELAYED RELEASE ORAL ONCE A DAY
Qty: 90 TABLET | Refills: 1 | OUTPATIENT
Start: 2023-04-20

## 2023-04-20 RX ORDER — OMEPRAZOLE 20 MG/1
1 CAPSULE, DELAYED RELEASE ORAL DAILY
Qty: 0 | Refills: 2 | Status: ON HOLD | COMMUNITY
Start: 2018-01-23

## 2023-04-20 RX ORDER — FAMOTIDINE 20 MG/1
TAKE ONE TABLET BY MOUTH TWICE A DAY TABLET, FILM COATED ORAL
Qty: 180 UNSPECIFIED | Refills: 1 | Status: ACTIVE | COMMUNITY

## 2023-04-20 RX ORDER — PLECANATIDE 3 MG/1
1 TABLET TABLET ORAL ONCE A DAY
Qty: 30 | Refills: 3 | Status: ACTIVE | COMMUNITY
Start: 2023-02-15 | End: 2023-06-15

## 2023-04-20 RX ORDER — LOSARTAN POTASSIUM 100 MG/1
TAKE ONE TABLET BY MOUTH ONE TIME DAILY TABLET, FILM COATED ORAL
Qty: 90 UNSPECIFIED | Refills: 0 | Status: ACTIVE | COMMUNITY

## 2023-04-20 RX ORDER — LEVOTHYROXINE SODIUM 50 UG/1
TABLET ORAL
Qty: 90 TABLET | Status: ACTIVE | COMMUNITY

## 2023-04-20 RX ORDER — WHEAT DEXTRIN 3 G/4 G
1 (ONE) POWDER (GRAM) ORAL DAILY
Qty: 0 | Refills: 2 | Status: ON HOLD | COMMUNITY
Start: 2018-02-28

## 2023-04-20 RX ORDER — ESCITALOPRAM OXALATE 10 MG/1
TAKE ONE AND ONE-HALF TABLETS BY MOUTH ONE TIME DAILY TABLET ORAL
Qty: 135 UNSPECIFIED | Refills: 2 | Status: ACTIVE | COMMUNITY

## 2023-04-20 RX ORDER — DOXYCYCLINE HYCLATE 100 MG/1
TAKE ONE CAPSULE BY MOUTH TWICE A DAY FOR 10 DAYS CAPSULE ORAL
Qty: 20 UNSPECIFIED | Refills: 0 | Status: ON HOLD | COMMUNITY

## 2023-04-20 RX ORDER — LORATADINE 5 MG
17 GRAMS TABLET,CHEWABLE ORAL DAILY
Qty: 0 | Refills: 16 | Status: ON HOLD | COMMUNITY
Start: 2018-02-28

## 2023-04-20 RX ORDER — HYDROCHLOROTHIAZIDE 25 MG/1
TABLET ORAL
Qty: 90 TABLET | Status: ACTIVE | COMMUNITY

## 2023-04-20 RX ORDER — OMEPRAZOLE 40 MG/1
1 (ONE) CAPSULE, DELAYED RELEASE ORAL
Qty: 0 | Refills: 16 | Status: ON HOLD | COMMUNITY
Start: 2018-05-29

## 2023-04-20 RX ORDER — CLOPIDOGREL 75 MG/1
TAKE ONE TABLET BY MOUTH ONE TIME DAILY TABLET, FILM COATED ORAL
Qty: 90 UNSPECIFIED | Refills: 0 | Status: ACTIVE | COMMUNITY

## 2023-04-20 RX ORDER — LINACLOTIDE 72 UG/1
1 (ONE) CAPSULE, GELATIN COATED ORAL AS DIRECTED
Qty: 0 | Refills: 5 | Status: ON HOLD | COMMUNITY
Start: 2022-05-02

## 2023-04-20 RX ORDER — FAMOTIDINE 10 MG
1 (ONE) TABLET ORAL
Qty: 0 | Refills: 16 | Status: ON HOLD | COMMUNITY
Start: 2018-02-28

## 2023-04-20 RX ORDER — ATORVASTATIN CALCIUM 20 MG/1
TABLET, FILM COATED ORAL
Qty: 90 TABLET | Status: ACTIVE | COMMUNITY

## 2023-12-06 ENCOUNTER — OFFICE VISIT (OUTPATIENT)
Dept: URBAN - METROPOLITAN AREA CLINIC 7 | Facility: CLINIC | Age: 83
End: 2023-12-06

## 2023-12-06 RX ORDER — ALPRAZOLAM 0.5 MG/1
TABLET ORAL
Qty: 60 TABLET | Status: ACTIVE | COMMUNITY

## 2023-12-06 RX ORDER — OMEPRAZOLE 40 MG/1
1 (ONE) CAPSULE, DELAYED RELEASE ORAL
Qty: 0 | Refills: 16 | Status: ON HOLD | COMMUNITY
Start: 2018-05-29

## 2023-12-06 RX ORDER — LEVOTHYROXINE SODIUM 50 UG/1
TABLET ORAL
Qty: 90 TABLET | Status: ACTIVE | COMMUNITY

## 2023-12-06 RX ORDER — LOSARTAN POTASSIUM 100 MG/1
TAKE ONE TABLET BY MOUTH ONE TIME DAILY TABLET, FILM COATED ORAL
Qty: 90 UNSPECIFIED | Refills: 0 | Status: ACTIVE | COMMUNITY

## 2023-12-06 RX ORDER — BIFIDOBACTERIUM LONGUM 10MM CELL
1 (ONE) CAPSULE ORAL
Qty: 0 | Refills: 16 | Status: ON HOLD | COMMUNITY
Start: 2018-05-14

## 2023-12-06 RX ORDER — OMEPRAZOLE 40 MG/1
1 TABLET CAPSULE, DELAYED RELEASE ORAL ONCE A DAY
Qty: 90 TABLET | Refills: 1 | Status: ACTIVE | COMMUNITY
Start: 2023-04-20

## 2023-12-06 RX ORDER — LORATADINE 5 MG
17 GRAMS TABLET,CHEWABLE ORAL DAILY
Qty: 0 | Refills: 16 | Status: ON HOLD | COMMUNITY
Start: 2018-02-28

## 2023-12-06 RX ORDER — HYDROCHLOROTHIAZIDE 25 MG/1
TABLET ORAL
Qty: 90 TABLET | Status: ACTIVE | COMMUNITY

## 2023-12-06 RX ORDER — FAMOTIDINE 20 MG/1
TAKE ONE TABLET BY MOUTH TWICE A DAY TABLET, FILM COATED ORAL
Qty: 180 UNSPECIFIED | Refills: 1 | Status: ACTIVE | COMMUNITY

## 2023-12-06 RX ORDER — ESCITALOPRAM OXALATE 10 MG/1
TAKE ONE AND ONE-HALF TABLETS BY MOUTH ONE TIME DAILY TABLET ORAL
Qty: 135 UNSPECIFIED | Refills: 2 | Status: ACTIVE | COMMUNITY

## 2023-12-06 RX ORDER — FAMOTIDINE 10 MG
1 (ONE) TABLET ORAL
Qty: 0 | Refills: 16 | Status: ON HOLD | COMMUNITY
Start: 2018-02-28

## 2023-12-06 RX ORDER — WHEAT DEXTRIN 3 G/4 G
1 (ONE) POWDER (GRAM) ORAL DAILY
Qty: 0 | Refills: 2 | Status: ON HOLD | COMMUNITY
Start: 2018-02-28

## 2023-12-06 RX ORDER — PROPRANOLOL HYDROCHLORIDE 60 MG/1
TAKE ONE TABLET BY MOUTH ONE TIME DAILY TABLET ORAL
Qty: 90 UNSPECIFIED | Refills: 1 | Status: ACTIVE | COMMUNITY

## 2023-12-06 RX ORDER — DOXYCYCLINE HYCLATE 100 MG/1
TAKE ONE CAPSULE BY MOUTH TWICE A DAY FOR 10 DAYS CAPSULE ORAL
Qty: 20 UNSPECIFIED | Refills: 0 | Status: ON HOLD | COMMUNITY

## 2023-12-06 RX ORDER — OMEPRAZOLE 20 MG/1
1 CAPSULE, DELAYED RELEASE ORAL DAILY
Qty: 0 | Refills: 2 | Status: ON HOLD | COMMUNITY
Start: 2018-01-23

## 2023-12-06 RX ORDER — LINACLOTIDE 72 UG/1
1 (ONE) CAPSULE, GELATIN COATED ORAL AS DIRECTED
Qty: 0 | Refills: 5 | Status: ON HOLD | COMMUNITY
Start: 2022-05-02

## 2023-12-06 RX ORDER — CLOPIDOGREL 75 MG/1
TAKE ONE TABLET BY MOUTH ONE TIME DAILY TABLET, FILM COATED ORAL
Qty: 90 UNSPECIFIED | Refills: 0 | Status: ACTIVE | COMMUNITY

## 2023-12-06 RX ORDER — ATORVASTATIN CALCIUM 20 MG/1
TABLET, FILM COATED ORAL
Qty: 90 TABLET | Status: ACTIVE | COMMUNITY

## 2023-12-06 NOTE — HPI-SCREENING
LV 3/2023. Eval has been for constipation, bloating, and abdominal discomfort. UGI series in 2018 demonstrated a small hiatal hernia with mild gastroesophageal reflux.  No motility disorder was seen.  She was evaluated for dysphagia back in 2018 and had a manometry which was read as normal with an IRP of 9 and normal DCI.  She also had an endoscopy at that time with biopsies that were negative. CT scan only demonstrated diverticulosis and constipation.  She also had a colonoscopy in 2018 which demonstrate a 1 tubular adenoma. Normal hemoglobin and creatinine October 2021. She is on Plavix. Sensation of the need to go, but unable to go. Usually takes Prunes and Benefiber. Has carotid stent in place (10/2021). Rectal exam with small skin tags, no external hemorrhoids, no blood on glove, brown stool, no pain with digital exam, no hard stool in the rectal vault. CT without bowel inflammation, no LAD normal pancreas, pleural based nodule, small renal cysts. Advised PCP follow up of lung nodule. CBC and iron normal. Doing Miralax and Benefiber daily (once in AM), but still feels that she can't fully eliminate, smaller stools, incomplete defecation. Feels like she has to do manual evacuation at times. No alarm symptoms. MR defecography 4/2022 with incomplete defecation (more than 75% gel left behind), paradoxical puborectalis contraction, moderate cystocele. Reviewed her MRI results with her. Continues to use fiber and Miralax and sometimes uses manual evacuation. Still gets lower abdominal cramping. Weight is largely stable around 160 lbs. Does feel pelvic pressure at times and has to use digital manipulation to evacuate. Doing Miralax/Benefiber twice daily, pebble stools if she does not take these.  Given pelvic floor dyssynergia, referred to PFPT, as well as to Dalila Chapa for cystocele, continue fiber BID, Miralax BID. Tried Linzess 72 mcg every other day to see if this stimulates her a bit to go. She continues to have constipation issues, which is chronic. Taking miralax, benefiber, prunes. No bleeding. Still having bloating and very similar symptoms to prior. Still has some rectal stimulation to help her have BM's. She is not sure if she saw Dr. Chapa for her cystocele. Linzess made her go very quickly, but too urgently.  Last visit, she continues with issues with incomplete evacuation and constipation so continue her on Benefiber and MiraLAX. She also did pelvic floor physical therapy and still does exercises but did not help very much. Wanted to try Trulance in addition to her current bowel regimen, every other day. Plan was to do colonoscopy for surveillance. She does have 2 carotid stents in place and so plan was to hold her Plavix for 5 days prior to her procedure. Also wanted to do a 2-day prep. Colonoscopy April 2020 demonstrated decreased sphincter tone, small oozing polypoid lesion in the cecum which more likely represents an AVM with complete removal retrieval via hot snare, diverticulosis in the sigmoid and descending, and small nonbleeding internal hemorrhoids. EGD April 2023 demonstrated LA grade a esophagitis empiric dilation performed to 48 [place], erosive gastropathy, normal duodenum. 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 pylori. Plan was to trial omeprazole 40 mg daily in the morning to help with reflux and dysphagia symptoms. She was lost to follow-up thereafter.

## 2024-04-25 ENCOUNTER — LAB (OUTPATIENT)
Dept: URBAN - METROPOLITAN AREA CLINIC 7 | Facility: CLINIC | Age: 84
End: 2024-04-25

## 2024-04-25 ENCOUNTER — OV EP (OUTPATIENT)
Dept: URBAN - METROPOLITAN AREA CLINIC 7 | Facility: CLINIC | Age: 84
End: 2024-04-25
Payer: MEDICARE

## 2024-04-25 VITALS
TEMPERATURE: 97.8 F | BODY MASS INDEX: 26.16 KG/M2 | HEIGHT: 65 IN | SYSTOLIC BLOOD PRESSURE: 120 MMHG | DIASTOLIC BLOOD PRESSURE: 70 MMHG | WEIGHT: 157 LBS | RESPIRATION RATE: 16 BRPM

## 2024-04-25 DIAGNOSIS — E03.9 HYPOTHYROIDISM, UNSPECIFIED TYPE: ICD-10-CM

## 2024-04-25 DIAGNOSIS — K92.1 MELENA: ICD-10-CM

## 2024-04-25 DIAGNOSIS — K59.00 CONSTIPATION: ICD-10-CM

## 2024-04-25 DIAGNOSIS — R10.30 LOWER ABDOMINAL PAIN: ICD-10-CM

## 2024-04-25 DIAGNOSIS — R10.9 ABDOMINAL PAIN: ICD-10-CM

## 2024-04-25 DIAGNOSIS — R15.0 INCOMPLETE DEFECATION: ICD-10-CM

## 2024-04-25 DIAGNOSIS — Z86.010 HISTORY OF COLON POLYPS: ICD-10-CM

## 2024-04-25 DIAGNOSIS — K58.1 IRRITABLE BOWEL SYNDROME WITH CONSTIPATION: ICD-10-CM

## 2024-04-25 DIAGNOSIS — N81.10 FEMALE CYSTOCELE: ICD-10-CM

## 2024-04-25 DIAGNOSIS — M62.89 PELVIC FLOOR DYSFUNCTION: ICD-10-CM

## 2024-04-25 DIAGNOSIS — K21.9 GASTROESOPHAGEAL REFLUX DISEASE: ICD-10-CM

## 2024-04-25 PROBLEM — 2901004: Status: ACTIVE | Noted: 2024-04-25

## 2024-04-25 PROCEDURE — 99214 OFFICE O/P EST MOD 30 MIN: CPT | Performed by: INTERNAL MEDICINE

## 2024-04-25 RX ORDER — WHEAT DEXTRIN 3 G/4 G
1 (ONE) POWDER (GRAM) ORAL DAILY
Qty: 0 | Refills: 2 | Status: DISCONTINUED | COMMUNITY
Start: 2018-02-28

## 2024-04-25 RX ORDER — CLOPIDOGREL 75 MG/1
TAKE ONE TABLET BY MOUTH ONE TIME DAILY TABLET, FILM COATED ORAL
Qty: 90 UNSPECIFIED | Refills: 0 | Status: ACTIVE | COMMUNITY

## 2024-04-25 RX ORDER — METHOCARBAMOL 750 MG/1
1 TABLET TABLET ORAL
Status: ACTIVE | COMMUNITY

## 2024-04-25 RX ORDER — FAMOTIDINE 10 MG
1 (ONE) TABLET ORAL
Qty: 0 | Refills: 16 | Status: DISCONTINUED | COMMUNITY
Start: 2018-02-28

## 2024-04-25 RX ORDER — OMEPRAZOLE 40 MG/1
1 TABLET CAPSULE, DELAYED RELEASE ORAL ONCE A DAY
Qty: 90 TABLET | Refills: 1 | Status: ACTIVE | COMMUNITY
Start: 2023-04-20

## 2024-04-25 RX ORDER — LORATADINE 5 MG
17 GRAMS TABLET,CHEWABLE ORAL DAILY
Qty: 0 | Refills: 16 | Status: DISCONTINUED | COMMUNITY
Start: 2018-02-28

## 2024-04-25 RX ORDER — PROPRANOLOL HYDROCHLORIDE 60 MG/1
TAKE ONE TABLET BY MOUTH ONE TIME DAILY TABLET ORAL
Qty: 90 UNSPECIFIED | Refills: 1 | Status: ACTIVE | COMMUNITY

## 2024-04-25 RX ORDER — OMEPRAZOLE 40 MG/1
1 (ONE) CAPSULE, DELAYED RELEASE ORAL
Qty: 0 | Refills: 16 | Status: DISCONTINUED | COMMUNITY
Start: 2018-05-29

## 2024-04-25 RX ORDER — LEVOTHYROXINE SODIUM 50 UG/1
TABLET ORAL
Qty: 90 TABLET | Status: ACTIVE | COMMUNITY

## 2024-04-25 RX ORDER — AMLODIPINE BESYLATE 10 MG/1
1 TABLET TABLET ORAL ONCE A DAY
Status: ACTIVE | COMMUNITY

## 2024-04-25 RX ORDER — ALPRAZOLAM 0.5 MG/1
TABLET ORAL
Qty: 60 TABLET | Status: ACTIVE | COMMUNITY

## 2024-04-25 RX ORDER — ESCITALOPRAM OXALATE 10 MG/1
TAKE ONE AND ONE-HALF TABLETS BY MOUTH ONE TIME DAILY TABLET ORAL
Qty: 135 UNSPECIFIED | Refills: 2 | Status: ACTIVE | COMMUNITY

## 2024-04-25 RX ORDER — BIFIDOBACTERIUM LONGUM 10MM CELL
1 (ONE) CAPSULE ORAL
Qty: 0 | Refills: 16 | Status: DISCONTINUED | COMMUNITY
Start: 2018-05-14

## 2024-04-25 RX ORDER — ATORVASTATIN CALCIUM 20 MG/1
TABLET, FILM COATED ORAL
Qty: 90 TABLET | Status: ACTIVE | COMMUNITY

## 2024-04-25 RX ORDER — LINACLOTIDE 72 UG/1
1 (ONE) CAPSULE, GELATIN COATED ORAL AS DIRECTED
Qty: 0 | Refills: 5 | Status: DISCONTINUED | COMMUNITY
Start: 2022-05-02

## 2024-04-25 RX ORDER — OMEPRAZOLE 20 MG/1
1 CAPSULE, DELAYED RELEASE ORAL DAILY
Qty: 0 | Refills: 2 | Status: DISCONTINUED | COMMUNITY
Start: 2018-01-23

## 2024-04-25 RX ORDER — DOXYCYCLINE HYCLATE 100 MG/1
TAKE ONE CAPSULE BY MOUTH TWICE A DAY FOR 10 DAYS CAPSULE ORAL
Qty: 20 UNSPECIFIED | Refills: 0 | Status: DISCONTINUED | COMMUNITY

## 2024-04-25 RX ORDER — HYDROCHLOROTHIAZIDE 25 MG/1
TABLET ORAL
Qty: 90 TABLET | Status: ACTIVE | COMMUNITY

## 2024-04-25 RX ORDER — LOSARTAN POTASSIUM 100 MG/1
TAKE ONE TABLET BY MOUTH ONE TIME DAILY TABLET, FILM COATED ORAL
Qty: 90 UNSPECIFIED | Refills: 0 | Status: ACTIVE | COMMUNITY

## 2024-04-25 RX ORDER — LUBIPROSTONE 8 UG/1
1 CAPSULE WITH FOOD AND WATER CAPSULE, GELATIN COATED ORAL
Qty: 30 | Refills: 3 | OUTPATIENT
Start: 2024-04-25 | End: 2024-08-23

## 2024-04-25 RX ORDER — FAMOTIDINE 20 MG/1
TAKE ONE TABLET BY MOUTH TWICE A DAY TABLET, FILM COATED ORAL
Qty: 180 UNSPECIFIED | Refills: 1 | Status: ACTIVE | COMMUNITY

## 2024-04-25 NOTE — HPI-SCREENING
LV 3/2023. Eval has been for constipation, bloating, and abdominal discomfort. UGI series in 2018 demonstrated a small hiatal hernia with mild gastroesophageal reflux.  No motility disorder was seen.  She was evaluated for dysphagia back in 2018 and had a manometry which was read as normal with an IRP of 9 and normal DCI.  She also had an endoscopy at that time with biopsies that were negative. CT scan only demonstrated diverticulosis and constipation.  She also had a colonoscopy in 2018 which demonstrate a 1 tubular adenoma. Normal hemoglobin and creatinine October 2021. She is on Plavix. Sensation of the need to go, but unable to go. Usually takes Prunes and Benefiber. Has carotid stent in place (10/2021). Rectal exam with small skin tags, no external hemorrhoids, no blood on glove, brown stool, no pain with digital exam, no hard stool in the rectal vault. CT without bowel inflammation, no LAD normal pancreas, pleural based nodule, small renal cysts. Advised PCP follow up of lung nodule. CBC and iron normal. Doing Miralax and Benefiber daily (once in AM), but still feels that she can't fully eliminate, smaller stools, incomplete defecation. Feels like she has to do manual evacuation at times. No alarm symptoms. MR defecography 4/2022 with incomplete defecation (more than 75% gel left behind), paradoxical puborectalis contraction, moderate cystocele. Reviewed her MRI results with her. Continues to use fiber and Miralax and sometimes uses manual evacuation. Still gets lower abdominal cramping. Weight is largely stable around 160 lbs. Does feel pelvic pressure at times and has to use digital manipulation to evacuate. Doing Miralax/Benefiber twice daily, pebble stools if she does not take these.  Given pelvic floor dyssynergia, referred to PFPT, as well as to Dalila Chapa for cystocele, continue fiber BID, Miralax BID. Tried Linzess 72 mcg every other day to see if this stimulates her a bit to go. She continues to have constipation issues, which is chronic. Taking miralax, benefiber, prunes. No bleeding. Still having bloating and very similar symptoms to prior. Still has some rectal stimulation to help her have BM's. She is not sure if she saw Dr. Chapa for her cystocele. Linzess made her go very quickly, but too urgently.  Last visit, she continues with issues with incomplete evacuation and constipation so continue her on Benefiber and MiraLAX. She also did pelvic floor physical therapy and still does exercises but did not help very much. Wanted to try Trulance in addition to her current bowel regimen, every other day. Plan was to do colonoscopy for surveillance. She does have 2 carotid stents in place and so plan was to hold her Plavix for 5 days prior to her procedure. Also wanted to do a 2-day prep. Colonoscopy April 2023 demonstrated decreased sphincter tone, small oozing polypoid lesion in the cecum which more likely represents an AVM with complete removal retrieval via hot snare, diverticulosis in the sigmoid and descending, and small nonbleeding internal hemorrhoids. EGD April 2023 demonstrated LA grade a esophagitis empiric dilation performed to 48 [place], erosive gastropathy, normal duodenum. 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 pylori. Plan was to trial omeprazole 40 mg daily in the morning to help with reflux and dysphagia symptoms. She was lost to follow-up thereafter.  Still doing Miralax/fiber, but having bloating, distention, poor appetite, having darker soft stools, as well as pressure in the pelvis. Continues to have manual need for evacuation, pelvis floor symptoms, sometimes loss of control. Weight is largely stable, on anxiety medication. Xanax prn, Lexapro.

## 2024-04-25 NOTE — PHYSICAL EXAM NEUROLOGIC:
Office Policies    Phone calls/patient messages:            Please allow up to 24 hours for someone in the office to contact you about your call or message. Be mindful your provider may be out of the office or your message may require further review. We encourage you to use [facility] for your messages as this is a faster, more efficient way to communicate with our office                         Medication Refills:            Prescription medications require 48-72 business hours to process. We encourage you to use [facility] for your refills. For controlled medications: Please allow 72 business hours to process. Certain medications may require you to  a written prescription at our office. NO narcotic/controlled medications will be prescribed after 4pm Monday through Friday or on weekends              Form/Paperwork Completion:            Please note a $25 fee may incur for all paperwork for completed by our providers. We ask that you allow 7-10 business days. Pre-payment is due prior to picking up/faxing the completed form. You may also download your forms to [facility] to have your doctor print off. oriented to person, place and time , normal sensation , short and long term memory intact

## 2024-05-02 ENCOUNTER — CLAIMS CREATED FROM THE CLAIM WINDOW (OUTPATIENT)
Dept: URBAN - METROPOLITAN AREA SURGERY CENTER 5 | Facility: SURGERY CENTER | Age: 84
End: 2024-05-02
Payer: MEDICARE

## 2024-05-02 ENCOUNTER — TELEPHONE ENCOUNTER (OUTPATIENT)
Dept: URBAN - METROPOLITAN AREA CLINIC 7 | Facility: CLINIC | Age: 84
End: 2024-05-02

## 2024-05-02 ENCOUNTER — CLAIMS CREATED FROM THE CLAIM WINDOW (OUTPATIENT)
Dept: URBAN - METROPOLITAN AREA CLINIC 4 | Facility: CLINIC | Age: 84
End: 2024-05-02
Payer: MEDICARE

## 2024-05-02 ENCOUNTER — ERX REFILL RESPONSE (OUTPATIENT)
Dept: URBAN - METROPOLITAN AREA CLINIC 7 | Facility: CLINIC | Age: 84
End: 2024-05-02

## 2024-05-02 DIAGNOSIS — K29.70 GASTRITIS, UNSPECIFIED, WITHOUT BLEEDING: ICD-10-CM

## 2024-05-02 DIAGNOSIS — K31.89 OTHER DISEASES OF STOMACH AND DUODENUM: ICD-10-CM

## 2024-05-02 DIAGNOSIS — K31.89 REACTIVE GASTROPATHY: ICD-10-CM

## 2024-05-02 PROCEDURE — 43239 EGD BIOPSY SINGLE/MULTIPLE: CPT | Performed by: CLINIC/CENTER

## 2024-05-02 PROCEDURE — 43239 EGD BIOPSY SINGLE/MULTIPLE: CPT | Performed by: INTERNAL MEDICINE

## 2024-05-02 PROCEDURE — 88305 TISSUE EXAM BY PATHOLOGIST: CPT | Performed by: PATHOLOGY

## 2024-05-02 PROCEDURE — 00731 ANES UPR GI NDSC PX NOS: CPT | Performed by: NURSE ANESTHETIST, CERTIFIED REGISTERED

## 2024-05-02 PROCEDURE — 88312 SPECIAL STAINS GROUP 1: CPT | Performed by: PATHOLOGY

## 2024-05-02 RX ORDER — PROPRANOLOL HYDROCHLORIDE 60 MG/1
TAKE ONE TABLET BY MOUTH ONE TIME DAILY TABLET ORAL
Qty: 90 UNSPECIFIED | Refills: 1 | Status: ACTIVE | COMMUNITY

## 2024-05-02 RX ORDER — SUCRALFATE 1 G/1
1 TABLET ON AN EMPTY STOMACH TABLET ORAL
Qty: 84 | Refills: 0 | OUTPATIENT
Start: 2024-05-02 | End: 2024-05-30

## 2024-05-02 RX ORDER — ALPRAZOLAM 0.5 MG/1
TABLET ORAL
Qty: 60 TABLET | Status: ACTIVE | COMMUNITY

## 2024-05-02 RX ORDER — HYDROCHLOROTHIAZIDE 25 MG/1
TABLET ORAL
Qty: 90 TABLET | Status: ACTIVE | COMMUNITY

## 2024-05-02 RX ORDER — OMEPRAZOLE 40 MG/1
1 TABLET CAPSULE, DELAYED RELEASE ORAL ONCE A DAY
Qty: 90 TABLET | Refills: 1 | Status: ACTIVE | COMMUNITY
Start: 2023-04-20

## 2024-05-02 RX ORDER — OMEPRAZOLE 40 MG/1
TAKE ONE CAPSULE BY MOUTH ONE TIME DAILY FOR 90 DAYS CAPSULE, DELAYED RELEASE ORAL
Qty: 90 CAPSULE | Refills: 3 | OUTPATIENT

## 2024-05-02 RX ORDER — ESCITALOPRAM OXALATE 10 MG/1
TAKE ONE AND ONE-HALF TABLETS BY MOUTH ONE TIME DAILY TABLET ORAL
Qty: 135 UNSPECIFIED | Refills: 2 | Status: ACTIVE | COMMUNITY

## 2024-05-02 RX ORDER — ATORVASTATIN CALCIUM 20 MG/1
TABLET, FILM COATED ORAL
Qty: 90 TABLET | Status: ACTIVE | COMMUNITY

## 2024-05-02 RX ORDER — CLOPIDOGREL 75 MG/1
TAKE ONE TABLET BY MOUTH ONE TIME DAILY TABLET ORAL
Qty: 90 UNSPECIFIED | Refills: 0 | Status: ACTIVE | COMMUNITY

## 2024-05-02 RX ORDER — AMLODIPINE BESYLATE 10 MG/1
1 TABLET TABLET ORAL ONCE A DAY
Status: ACTIVE | COMMUNITY

## 2024-05-02 RX ORDER — METHOCARBAMOL 750 MG/1
1 TABLET TABLET ORAL
Status: ACTIVE | COMMUNITY

## 2024-05-02 RX ORDER — LOSARTAN POTASSIUM 100 MG/1
TAKE ONE TABLET BY MOUTH ONE TIME DAILY TABLET, FILM COATED ORAL
Qty: 90 UNSPECIFIED | Refills: 0 | Status: ACTIVE | COMMUNITY

## 2024-05-02 RX ORDER — LEVOTHYROXINE SODIUM 50 UG/1
TABLET ORAL
Qty: 90 TABLET | Status: ACTIVE | COMMUNITY

## 2024-05-02 RX ORDER — LUBIPROSTONE 8 UG/1
1 CAPSULE WITH FOOD AND WATER CAPSULE, GELATIN COATED ORAL
Qty: 30 | Refills: 3 | Status: ACTIVE | COMMUNITY
Start: 2024-04-25 | End: 2024-08-23

## 2024-05-02 RX ORDER — OMEPRAZOLE 40 MG/1
1 TABLET CAPSULE, DELAYED RELEASE ORAL ONCE A DAY
Qty: 90 TABLET | Refills: 1 | OUTPATIENT

## 2024-05-02 RX ORDER — FAMOTIDINE 20 MG/1
TAKE ONE TABLET BY MOUTH TWICE A DAY TABLET, FILM COATED ORAL
Qty: 180 UNSPECIFIED | Refills: 1 | Status: ACTIVE | COMMUNITY

## 2024-05-03 ENCOUNTER — TELEPHONE ENCOUNTER (OUTPATIENT)
Dept: URBAN - METROPOLITAN AREA CLINIC 7 | Facility: CLINIC | Age: 84
End: 2024-05-03

## 2024-06-24 ENCOUNTER — DASHBOARD ENCOUNTERS (OUTPATIENT)
Age: 84
End: 2024-06-24

## 2024-06-24 ENCOUNTER — OFFICE VISIT (OUTPATIENT)
Dept: URBAN - METROPOLITAN AREA CLINIC 7 | Facility: CLINIC | Age: 84
End: 2024-06-24
Payer: MEDICARE

## 2024-06-24 VITALS
BODY MASS INDEX: 26.16 KG/M2 | SYSTOLIC BLOOD PRESSURE: 130 MMHG | HEIGHT: 65 IN | DIASTOLIC BLOOD PRESSURE: 70 MMHG | WEIGHT: 157 LBS | TEMPERATURE: 97.8 F | RESPIRATION RATE: 16 BRPM

## 2024-06-24 DIAGNOSIS — E03.9 HYPOTHYROIDISM, UNSPECIFIED TYPE: ICD-10-CM

## 2024-06-24 DIAGNOSIS — R15.0 INCOMPLETE DEFECATION: ICD-10-CM

## 2024-06-24 DIAGNOSIS — N81.10 FEMALE CYSTOCELE: ICD-10-CM

## 2024-06-24 DIAGNOSIS — K21.9 GASTROESOPHAGEAL REFLUX DISEASE: ICD-10-CM

## 2024-06-24 DIAGNOSIS — M62.89 PELVIC FLOOR DYSFUNCTION: ICD-10-CM

## 2024-06-24 DIAGNOSIS — K92.1 MELENA: ICD-10-CM

## 2024-06-24 DIAGNOSIS — R10.9 ABDOMINAL PAIN: ICD-10-CM

## 2024-06-24 DIAGNOSIS — K58.1 IRRITABLE BOWEL SYNDROME WITH CONSTIPATION: ICD-10-CM

## 2024-06-24 DIAGNOSIS — K59.00 CONSTIPATION: ICD-10-CM

## 2024-06-24 DIAGNOSIS — R10.30 LOWER ABDOMINAL PAIN: ICD-10-CM

## 2024-06-24 DIAGNOSIS — Z86.010 HISTORY OF COLON POLYPS: ICD-10-CM

## 2024-06-24 PROCEDURE — 99214 OFFICE O/P EST MOD 30 MIN: CPT | Performed by: INTERNAL MEDICINE

## 2024-06-24 RX ORDER — CLOPIDOGREL 75 MG/1
TAKE ONE TABLET BY MOUTH ONE TIME DAILY TABLET ORAL
Qty: 90 UNSPECIFIED | Refills: 0 | Status: DISCONTINUED | COMMUNITY

## 2024-06-24 RX ORDER — SUCRALFATE 1 G/1
1 TABLET ON AN EMPTY STOMACH TABLET ORAL TWICE A DAY
Qty: 60 | Refills: 11
Start: 2024-05-02 | End: 2025-06-19

## 2024-06-24 RX ORDER — ALPRAZOLAM 0.5 MG/1
TABLET ORAL
Qty: 60 TABLET | Status: ACTIVE | COMMUNITY

## 2024-06-24 RX ORDER — ATORVASTATIN CALCIUM 20 MG/1
TABLET, FILM COATED ORAL
Qty: 90 TABLET | Status: ACTIVE | COMMUNITY

## 2024-06-24 RX ORDER — LEVOTHYROXINE SODIUM 50 UG/1
TABLET ORAL
Qty: 90 TABLET | Status: ACTIVE | COMMUNITY

## 2024-06-24 RX ORDER — LOSARTAN POTASSIUM 100 MG/1
TAKE ONE TABLET BY MOUTH ONE TIME DAILY TABLET, FILM COATED ORAL
Qty: 90 UNSPECIFIED | Refills: 0 | Status: ACTIVE | COMMUNITY

## 2024-06-24 RX ORDER — ASPIRIN 81 MG/1
1 TABLET TABLET, COATED ORAL ONCE A DAY
Status: ACTIVE | COMMUNITY

## 2024-06-24 RX ORDER — AMLODIPINE BESYLATE 2.5 MG/1
1 TABLET TABLET ORAL ONCE A DAY
Status: ACTIVE | COMMUNITY

## 2024-06-24 RX ORDER — PROPRANOLOL HYDROCHLORIDE 60 MG/1
TAKE ONE TABLET BY MOUTH ONE TIME DAILY TABLET ORAL
Qty: 90 UNSPECIFIED | Refills: 1 | Status: ACTIVE | COMMUNITY

## 2024-06-24 RX ORDER — CLONIDINE HYDROCHLORIDE 0.1 MG/1
1 TABLET TABLET ORAL ONCE A DAY
Status: ACTIVE | COMMUNITY

## 2024-06-24 RX ORDER — ESCITALOPRAM OXALATE 10 MG/1
TAKE ONE AND ONE-HALF TABLETS BY MOUTH ONE TIME DAILY TABLET ORAL
Qty: 135 UNSPECIFIED | Refills: 2 | Status: ACTIVE | COMMUNITY

## 2024-06-24 RX ORDER — LUBIPROSTONE 8 UG/1
1 CAPSULE WITH FOOD AND WATER CAPSULE, GELATIN COATED ORAL
Qty: 30 | Refills: 3
Start: 2024-04-25 | End: 2024-10-22

## 2024-06-24 RX ORDER — OMEPRAZOLE 40 MG/1
TAKE ONE CAPSULE BY MOUTH ONE TIME DAILY FOR 90 DAYS CAPSULE, DELAYED RELEASE ORAL
Qty: 90 CAPSULE | Refills: 3 | Status: ACTIVE | COMMUNITY

## 2024-06-24 RX ORDER — FAMOTIDINE 20 MG/1
TAKE ONE TABLET BY MOUTH TWICE A DAY TABLET, FILM COATED ORAL
Qty: 180 UNSPECIFIED | Refills: 1 | Status: ACTIVE | COMMUNITY

## 2024-06-24 RX ORDER — HYDROCHLOROTHIAZIDE 25 MG/1
TABLET ORAL
Qty: 90 TABLET | Status: ACTIVE | COMMUNITY

## 2024-06-24 RX ORDER — LUBIPROSTONE 8 UG/1
1 CAPSULE WITH FOOD AND WATER CAPSULE, GELATIN COATED ORAL
Qty: 30 | Refills: 3 | Status: ACTIVE | COMMUNITY
Start: 2024-04-25 | End: 2024-08-23

## 2024-06-24 RX ORDER — METHOCARBAMOL 750 MG/1
1 TABLET TABLET ORAL
Status: ACTIVE | COMMUNITY

## 2024-06-24 NOTE — HPI-SCREENING
LV 4/2024. Eval has been for constipation, bloating, and abdominal discomfort. UGI series in 2018 demonstrated a small hiatal hernia with mild gastroesophageal reflux.  No motility disorder was seen.  She was evaluated for dysphagia back in 2018 and had a manometry which was read as normal with an IRP of 9 and normal DCI.  She also had an endoscopy at that time with biopsies that were negative. CT scan only demonstrated diverticulosis and constipation.  She also had a colonoscopy in 2018 which demonstrate a 1 tubular adenoma. Normal hemoglobin and creatinine October 2021. She is on Plavix. Sensation of the need to go, but unable to go. Usually takes Prunes and Benefiber. Has carotid stent in place (10/2021). Rectal exam with small skin tags, no external hemorrhoids, no blood on glove, brown stool, no pain with digital exam, no hard stool in the rectal vault. CT without bowel inflammation, no LAD normal pancreas, pleural based nodule, small renal cysts. Advised PCP follow up of lung nodule. CBC and iron normal. Doing Miralax and Benefiber daily (once in AM), but still feels that she can't fully eliminate, smaller stools, incomplete defecation. Feels like she has to do manual evacuation at times. No alarm symptoms. MR defecography 4/2022 with incomplete defecation (more than 75% gel left behind), paradoxical puborectalis contraction, moderate cystocele. Reviewed her MRI results with her. Continues to use fiber and Miralax and sometimes uses manual evacuation. Still gets lower abdominal cramping. Weight is largely stable around 160 lbs. Does feel pelvic pressure at times and has to use digital manipulation to evacuate. Doing Miralax/Benefiber twice daily, pebble stools if she does not take these.  Given pelvic floor dyssynergia, referred to PFPT, as well as to Dalila Chapa for cystocele, continue fiber BID, Miralax BID. Tried Linzess 72 mcg every other day to see if this stimulates her a bit to go. She continues to have constipation issues, which is chronic. Taking miralax, benefiber, prunes. No bleeding. Still having bloating and very similar symptoms to prior. Still has some rectal stimulation to help her have BM's. She is not sure if she saw Dr. Chapa for her cystocele. Linzess made her go very quickly, but too urgently. She continued with issues with incomplete evacuation and constipation so continue her on Benefiber and MiraLAX. She also did pelvic floor physical therapy and still does exercises but did not help very much. Wanted to try Trulance in addition to her current bowel regimen, every other day. Plan was to do colonoscopy for surveillance. She does have 2 carotid stents in place and so plan was to hold her Plavix for 5 days prior to her procedure. Also wanted to do a 2-day prep. Colonoscopy April 2023 demonstrated decreased sphincter tone, small oozing polypoid lesion in the cecum which more likely represents an AVM with complete removal retrieval via hot snare, diverticulosis in the sigmoid and descending, and small nonbleeding internal hemorrhoids. EGD April 2023 demonstrated LA grade A esophagitis empiric dilation performed to 48 [place], erosive gastropathy, normal duodenum. 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 pylori. Plan was to trial omeprazole 40 mg daily in the morning to help with reflux and dysphagia symptoms. She was lost to follow-up thereafter. Still doing Miralax/fiber, but having bloating, distention, poor appetite, having darker soft stools, as well as pressure in the pelvis. Continues to have manual need for evacuation, pelvis floor symptoms, sometimes loss of control. Weight is largely stable, on anxiety medication. Xanax prn, Lexapro. Was having ongoing symptoms of pelvic floor dysfunction, manual evacuation, and bloating/distention, which are somewhat chronic symptoms. Advised her to follow up with Dr. Chapa given cystocele and incontinence, to see if SNS would be an option for her. Increased fiber to twice daily, continue Miralax once daily, and add just once daily lubiprostone to assess effect. FU now. EGD 5/2024 with erosive gastropathy, as well as healed scarring in the antrum suggestive of prior ulceration. Treated with PPI and sucralfate. CT A/P with contrast 4/2024 negative for bowel inflammation, normal panc, no LAD. Advised FU with Eduard. FU now.  She had COVID recently but has recovered from this. Still some fatigue, issues with BP, med changes going on. She has been doing well on sucralfate TID. Lubiprostone has helped, and only taking once daily, and going well to the bathroom. She has been on omeprazole as well. She has been omeprazole since 2023. She saw Eduard since last visit. She did have ER visit for elevated BPs in the 200s, and was ntoed to be hyponatremic, which then resolved with fluids.

## 2024-10-23 ENCOUNTER — OFFICE VISIT (OUTPATIENT)
Dept: URBAN - METROPOLITAN AREA CLINIC 7 | Facility: CLINIC | Age: 84
End: 2024-10-23
Payer: MEDICARE

## 2024-10-23 ENCOUNTER — TELEPHONE ENCOUNTER (OUTPATIENT)
Dept: URBAN - METROPOLITAN AREA CLINIC 7 | Facility: CLINIC | Age: 84
End: 2024-10-23

## 2024-10-23 VITALS
HEIGHT: 65 IN | DIASTOLIC BLOOD PRESSURE: 70 MMHG | WEIGHT: 151 LBS | RESPIRATION RATE: 16 BRPM | HEART RATE: 71 BPM | SYSTOLIC BLOOD PRESSURE: 110 MMHG | BODY MASS INDEX: 25.16 KG/M2 | TEMPERATURE: 97.6 F

## 2024-10-23 DIAGNOSIS — R15.0 INCOMPLETE DEFECATION: ICD-10-CM

## 2024-10-23 DIAGNOSIS — K58.1 IRRITABLE BOWEL SYNDROME WITH CONSTIPATION: ICD-10-CM

## 2024-10-23 DIAGNOSIS — K21.9 GASTROESOPHAGEAL REFLUX DISEASE: ICD-10-CM

## 2024-10-23 DIAGNOSIS — K59.09 ACUTE CONSTIPATION IN CHILDHOOD: ICD-10-CM

## 2024-10-23 PROCEDURE — 99214 OFFICE O/P EST MOD 30 MIN: CPT | Performed by: INTERNAL MEDICINE

## 2024-10-23 RX ORDER — ESCITALOPRAM OXALATE 10 MG/1
TAKE ONE AND ONE-HALF TABLETS BY MOUTH ONE TIME DAILY TABLET ORAL
Qty: 135 UNSPECIFIED | Refills: 2 | Status: ACTIVE | COMMUNITY

## 2024-10-23 RX ORDER — PROPRANOLOL HYDROCHLORIDE 80 MG/1
TAKE ONE TABLET BY MOUTH ONE TIME DAILY TABLET ORAL
Qty: 90 UNSPECIFIED | Refills: 1 | Status: ACTIVE | COMMUNITY

## 2024-10-23 RX ORDER — VALSARTAN 320 MG/1
TAKE ONE TABLET BY MOUTH ONE TIME DAILY TABLET, FILM COATED ORAL
Qty: 90 UNSPECIFIED | Refills: 1 | Status: ACTIVE | COMMUNITY

## 2024-10-23 RX ORDER — LEVOTHYROXINE SODIUM 50 UG/1
TABLET ORAL
Qty: 90 TABLET | Status: ACTIVE | COMMUNITY

## 2024-10-23 RX ORDER — SUCRALFATE 1 G/1
1 TABLET ON AN EMPTY STOMACH TABLET ORAL TWICE A DAY
Qty: 60 | Refills: 11
Start: 2024-05-02 | End: 2025-10-18

## 2024-10-23 RX ORDER — AMLODIPINE BESYLATE 2.5 MG/1
1 TABLET TABLET ORAL ONCE A DAY
Status: ACTIVE | COMMUNITY

## 2024-10-23 RX ORDER — LOSARTAN POTASSIUM 100 MG/1
TAKE ONE TABLET BY MOUTH ONE TIME DAILY TABLET, FILM COATED ORAL
Qty: 90 UNSPECIFIED | Refills: 0 | Status: DISCONTINUED | COMMUNITY

## 2024-10-23 RX ORDER — AMLODIPINE BESYLATE 2.5 MG/1
1 TABLET TABLET ORAL ONCE A DAY
OUTPATIENT

## 2024-10-23 RX ORDER — LUBIPROSTONE 8 UG/1
1 CAPSULE WITH FOOD AND WATER CAPSULE, GELATIN COATED ORAL ONCE DAILY
Qty: 30 | Refills: 11 | OUTPATIENT
Start: 2024-10-23 | End: 2025-10-18

## 2024-10-23 RX ORDER — FAMOTIDINE 20 MG/1
TAKE ONE TABLET BY MOUTH TWICE A DAY TABLET, FILM COATED ORAL
Qty: 180 UNSPECIFIED | Refills: 1 | Status: ACTIVE | COMMUNITY

## 2024-10-23 RX ORDER — OMEPRAZOLE 40 MG/1
TAKE ONE CAPSULE BY MOUTH ONE TIME DAILY FOR 90 DAYS CAPSULE, DELAYED RELEASE ORAL
Qty: 90 CAPSULE | Refills: 3 | Status: ACTIVE | COMMUNITY

## 2024-10-23 RX ORDER — ASPIRIN 81 MG/1
1 TABLET TABLET, COATED ORAL ONCE A DAY
Status: ACTIVE | COMMUNITY

## 2024-10-23 RX ORDER — METHOCARBAMOL 750 MG/1
1 TABLET TABLET ORAL
Status: ACTIVE | COMMUNITY

## 2024-10-23 RX ORDER — SUCRALFATE 1 G/1
1 TABLET ON AN EMPTY STOMACH TABLET ORAL TWICE A DAY
Qty: 60 | Refills: 11 | Status: ACTIVE | COMMUNITY
Start: 2024-05-02 | End: 2025-06-19

## 2024-10-23 RX ORDER — CLONIDINE HYDROCHLORIDE 0.1 MG/1
1 TABLET TABLET ORAL ONCE A DAY
Status: ACTIVE | COMMUNITY

## 2024-10-23 RX ORDER — ALPRAZOLAM 0.5 MG/1
TABLET ORAL
Qty: 60 TABLET | Status: ACTIVE | COMMUNITY

## 2024-10-23 RX ORDER — HYDROCHLOROTHIAZIDE 25 MG/1
TABLET ORAL
Qty: 90 TABLET | Status: ACTIVE | COMMUNITY

## 2024-10-23 RX ORDER — HYDRALAZINE HYDROCHLORIDE 100 MG/1
1 TABLET WITH FOOD TABLET, FILM COATED ORAL THREE TIMES A DAY
Qty: 90 TABLET | OUTPATIENT
Start: 2024-10-23

## 2024-10-23 RX ORDER — CLONIDINE HYDROCHLORIDE 0.1 MG/1
1 TABLET TABLET ORAL ONCE A DAY
OUTPATIENT

## 2024-10-23 RX ORDER — ATORVASTATIN CALCIUM 20 MG/1
TABLET, FILM COATED ORAL
Qty: 90 TABLET | Status: ACTIVE | COMMUNITY

## 2024-10-23 NOTE — HPI-SCREENING
LV 6/2024. Eval has been for constipation, bloating, and abdominal discomfort. UGI series in 2018 demonstrated a small hiatal hernia with mild gastroesophageal reflux.  No motility disorder was seen.  She was evaluated for dysphagia back in 2018 and had a manometry which was read as normal with an IRP of 9 and normal DCI.  She also had an endoscopy at that time with biopsies that were negative. CT scan only demonstrated diverticulosis and constipation.  She also had a colonoscopy in 2018 which demonstrate a 1 tubular adenoma. Normal hemoglobin and creatinine October 2021. She is on Plavix. Sensation of the need to go, but unable to go. Usually takes Prunes and Benefiber. Has carotid stent in place (10/2021). Rectal exam with small skin tags, no external hemorrhoids, no blood on glove, brown stool, no pain with digital exam, no hard stool in the rectal vault. CT without bowel inflammation, no LAD normal pancreas, pleural based nodule, small renal cysts. Advised PCP follow up of lung nodule. CBC and iron normal. Doing Miralax and Benefiber daily (once in AM), but still feels that she can't fully eliminate, smaller stools, incomplete defecation. Feels like she has to do manual evacuation at times. No alarm symptoms. MR defecography 4/2022 with incomplete defecation (more than 75% gel left behind), paradoxical puborectalis contraction, moderate cystocele. Reviewed her MRI results with her. Continues to use fiber and Miralax and sometimes uses manual evacuation. Still gets lower abdominal cramping. Weight is largely stable around 160 lbs. Does feel pelvic pressure at times and has to use digital manipulation to evacuate. Doing Miralax/Benefiber twice daily, pebble stools if she does not take these.  Given pelvic floor dyssynergia, referred to PFPT, as well as to Dalila Chapa for cystocele, continue fiber BID, Miralax BID. Tried Linzess 72 mcg every other day to see if this stimulates her a bit to go. She continues to have constipation issues, which is chronic. Taking miralax, benefiber, prunes. No bleeding. Still having bloating and very similar symptoms to prior. Still has some rectal stimulation to help her have BM's. She is not sure if she saw Dr. Chapa for her cystocele. Linzess made her go very quickly, but too urgently. She continued with issues with incomplete evacuation and constipation so continue her on Benefiber and MiraLAX. She also did pelvic floor physical therapy and still does exercises but did not help very much. Wanted to try Trulance in addition to her current bowel regimen, every other day. Plan was to do colonoscopy for surveillance. She does have 2 carotid stents in place and so plan was to hold her Plavix for 5 days prior to her procedure. Also wanted to do a 2-day prep. Colonoscopy April 2023 demonstrated decreased sphincter tone, small oozing polypoid lesion in the cecum which more likely represents an AVM with complete removal retrieval via hot snare, diverticulosis in the sigmoid and descending, and small nonbleeding internal hemorrhoids. EGD April 2023 demonstrated LA grade A esophagitis empiric dilation performed to 48 Swedish, erosive gastropathy, normal duodenum. 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 pylori. Plan was to trial omeprazole 40 mg daily in the morning to help with reflux and dysphagia symptoms. She was lost to follow-up thereafter. Still doing Miralax/fiber, but having bloating, distention, poor appetite, having darker soft stools, as well as pressure in the pelvis. Continues to have manual need for evacuation, pelvic floor symptoms, sometimes loss of control. Weight is largely stable, on anxiety medication. Xanax prn, Lexapro. Was having ongoing symptoms of pelvic floor dysfunction, manual evacuation, and bloating/distention, which are somewhat chronic symptoms. Advised her to follow up with Dr. Chapa given cystocele and incontinence, to see if SNS would be an option for her. Increased fiber to twice daily, continue Miralax once daily, and add just once daily lubiprostone to assess effect. EGD 5/2024 with erosive gastropathy, as well as healed scarring in the antrum suggestive of prior ulceration. Treated with PPI and sucralfate. CT A/P with contrast 4/2024 negative for bowel inflammation, normal panc, no LAD. Advised FU with Eduard. She had COVID recently but has recovered from this. Still some fatigue, issues with BP, med changes going on. She has been doing well on sucralfate TID. Lubiprostone has helped, and only taking once daily, and going well to the bathroom. She has been on omeprazole as well. She has been omeprazole since 2023. She saw Eduard since last visit. She did have ER visit for elevated BPs in the 200s, and was ntoed to be hyponatremic, which then resolved with fluids. Decreased sucralfate to BID, lubiprostone once daily, omeprazole once daily. She is doing well on this regimen. No need for further testing. Follows with urogynecologist. Weight stable. She is still using fiber, but not as much Miralax due to lubiprostone effects which is fine. CBC, BMP more recently normal. FU now.  She has had issues with BP and so is on higher dose BP medications at this point. Better controlled. On propranolol for tremors. Weight is down 6 lbs. HCTZ is lower due to sodium levels. Sometimes pedal edema. Has been on sucralfate to BID, lubiprostone once daily, omeprazole once daily. She is taking benefiber, and back on miralax. Not on lubiprostone.

## 2024-11-07 ENCOUNTER — TELEPHONE ENCOUNTER (OUTPATIENT)
Dept: URBAN - METROPOLITAN AREA CLINIC 7 | Facility: CLINIC | Age: 84
End: 2024-11-07

## 2025-01-22 ENCOUNTER — OFFICE VISIT (OUTPATIENT)
Dept: URBAN - METROPOLITAN AREA CLINIC 7 | Facility: CLINIC | Age: 85
End: 2025-01-22
Payer: MEDICARE

## 2025-01-22 VITALS
BODY MASS INDEX: 25.16 KG/M2 | TEMPERATURE: 97.6 F | DIASTOLIC BLOOD PRESSURE: 70 MMHG | WEIGHT: 151 LBS | HEIGHT: 65 IN | HEART RATE: 64 BPM | RESPIRATION RATE: 16 BRPM | SYSTOLIC BLOOD PRESSURE: 110 MMHG

## 2025-01-22 DIAGNOSIS — R10.30 LOWER ABDOMINAL PAIN: ICD-10-CM

## 2025-01-22 DIAGNOSIS — K58.1 IRRITABLE BOWEL SYNDROME WITH CONSTIPATION: ICD-10-CM

## 2025-01-22 DIAGNOSIS — K21.9 GASTROESOPHAGEAL REFLUX DISEASE: ICD-10-CM

## 2025-01-22 DIAGNOSIS — K92.1 MELENA: ICD-10-CM

## 2025-01-22 DIAGNOSIS — E03.9 HYPOTHYROIDISM, UNSPECIFIED TYPE: ICD-10-CM

## 2025-01-22 DIAGNOSIS — N81.10 FEMALE CYSTOCELE: ICD-10-CM

## 2025-01-22 DIAGNOSIS — M62.89 PELVIC FLOOR DYSFUNCTION: ICD-10-CM

## 2025-01-22 DIAGNOSIS — K59.00 CONSTIPATION: ICD-10-CM

## 2025-01-22 DIAGNOSIS — R10.9 ABDOMINAL PAIN: ICD-10-CM

## 2025-01-22 DIAGNOSIS — Z86.0100 PERSONAL HISTORY OF COLON POLYPS, UNSPECIFIED: ICD-10-CM

## 2025-01-22 DIAGNOSIS — R15.0 INCOMPLETE DEFECATION: ICD-10-CM

## 2025-01-22 PROCEDURE — 99214 OFFICE O/P EST MOD 30 MIN: CPT | Performed by: INTERNAL MEDICINE

## 2025-01-22 RX ORDER — HYDROCHLOROTHIAZIDE 25 MG/1
TABLET ORAL
Qty: 90 TABLET | Status: ACTIVE | COMMUNITY

## 2025-01-22 RX ORDER — FAMOTIDINE 20 MG/1
TAKE ONE TABLET BY MOUTH TWICE A DAY TABLET, FILM COATED ORAL
Qty: 180 UNSPECIFIED | Refills: 1 | Status: ACTIVE | COMMUNITY

## 2025-01-22 RX ORDER — OMEPRAZOLE 40 MG/1
TAKE ONE CAPSULE BY MOUTH ONE TIME DAILY FOR 90 DAYS CAPSULE, DELAYED RELEASE ORAL
Qty: 90 CAPSULE | Refills: 3 | Status: ACTIVE | COMMUNITY

## 2025-01-22 RX ORDER — ALPRAZOLAM 0.5 MG/1
TABLET ORAL
Qty: 60 TABLET | Status: ACTIVE | COMMUNITY

## 2025-01-22 RX ORDER — CLOPIDOGREL 75 MG/1
1 TABLET TABLET, FILM COATED ORAL ONCE A DAY
Status: ACTIVE | COMMUNITY

## 2025-01-22 RX ORDER — LEVOTHYROXINE SODIUM 50 UG/1
TABLET ORAL
Qty: 90 TABLET | Status: ACTIVE | COMMUNITY

## 2025-01-22 RX ORDER — ESCITALOPRAM OXALATE 10 MG/1
TAKE ONE AND ONE-HALF TABLETS BY MOUTH ONE TIME DAILY TABLET ORAL
Qty: 135 UNSPECIFIED | Refills: 2 | Status: ACTIVE | COMMUNITY

## 2025-01-22 RX ORDER — ASPIRIN 81 MG/1
1 TABLET TABLET, COATED ORAL ONCE A DAY
Status: DISCONTINUED | COMMUNITY

## 2025-01-22 RX ORDER — METHOCARBAMOL 750 MG/1
1 TABLET TABLET ORAL
Status: ACTIVE | COMMUNITY

## 2025-01-22 RX ORDER — HYDRALAZINE HYDROCHLORIDE 100 MG/1
1 TABLET WITH FOOD TABLET, FILM COATED ORAL THREE TIMES A DAY
Qty: 90 TABLET | Status: ACTIVE | COMMUNITY
Start: 2024-10-23

## 2025-01-22 RX ORDER — LUBIPROSTONE 8 UG/1
1 CAPSULE WITH FOOD AND WATER CAPSULE, GELATIN COATED ORAL ONCE DAILY
Qty: 30 | Refills: 11 | Status: ACTIVE | COMMUNITY
Start: 2024-10-23 | End: 2025-10-18

## 2025-01-22 RX ORDER — ATORVASTATIN CALCIUM 20 MG/1
TABLET, FILM COATED ORAL
Qty: 90 TABLET | Status: ACTIVE | COMMUNITY

## 2025-01-22 RX ORDER — SUCRALFATE 1 G/1
1 TABLET ON AN EMPTY STOMACH TABLET ORAL TWICE A DAY
Qty: 60 | Refills: 11 | Status: ACTIVE | COMMUNITY
Start: 2024-05-02 | End: 2025-10-18

## 2025-01-22 RX ORDER — VALSARTAN 320 MG/1
TAKE ONE TABLET BY MOUTH ONE TIME DAILY TABLET, FILM COATED ORAL
Qty: 90 UNSPECIFIED | Refills: 1 | Status: ACTIVE | COMMUNITY

## 2025-01-22 RX ORDER — PROPRANOLOL HYDROCHLORIDE 80 MG/1
TAKE ONE TABLET BY MOUTH ONE TIME DAILY TABLET ORAL
Qty: 90 UNSPECIFIED | Refills: 1 | Status: ACTIVE | COMMUNITY

## 2025-01-22 NOTE — HPI-SCREENING
LV 10/2024. Eval has been for constipation, bloating, and abdominal discomfort. UGI series in 2018 demonstrated a small hiatal hernia with mild gastroesophageal reflux.  No motility disorder was seen.  She was evaluated for dysphagia back in 2018 and had a manometry which was read as normal with an IRP of 9 and normal DCI.  She also had an endoscopy at that time with biopsies that were negative. CT scan only demonstrated diverticulosis and constipation.  She also had a colonoscopy in 2018 which demonstrate a 1 tubular adenoma. Normal hemoglobin and creatinine October 2021. She is on Plavix. Sensation of the need to go, but unable to go. Usually takes Prunes and Benefiber. Has carotid stent in place (10/2021). Rectal exam with small skin tags, no external hemorrhoids, no blood on glove, brown stool, no pain with digital exam, no hard stool in the rectal vault. CT without bowel inflammation, no LAD normal pancreas, pleural based nodule, small renal cysts. Advised PCP follow up of lung nodule. CBC and iron normal. Doing Miralax and Benefiber daily (once in AM), but still feels that she can't fully eliminate, smaller stools, incomplete defecation. Feels like she has to do manual evacuation at times. No alarm symptoms. MR defecography 4/2022 with incomplete defecation (more than 75% gel left behind), paradoxical puborectalis contraction, moderate cystocele. Reviewed her MRI results with her. Continues to use fiber and Miralax and sometimes uses manual evacuation. Still gets lower abdominal cramping. Weight is largely stable around 160 lbs. Does feel pelvic pressure at times and has to use digital manipulation to evacuate. Doing Miralax/Benefiber twice daily, pebble stools if she does not take these.  Given pelvic floor dyssynergia, referred to PFPT, as well as to Dalila Chapa for cystocele, continue fiber BID, Miralax BID. Tried Linzess 72 mcg every other day to see if this stimulates her a bit to go. She continues to have constipation issues, which is chronic. Taking miralax, benefiber, prunes. No bleeding. Still having bloating and very similar symptoms to prior. Still has some rectal stimulation to help her have BM's. She is not sure if she saw Dr. Chapa for her cystocele. Linzess made her go very quickly, but too urgently. She continued with issues with incomplete evacuation and constipation so continue her on Benefiber and MiraLAX. She also did pelvic floor physical therapy and still does exercises but did not help very much. Wanted to try Trulance in addition to her current bowel regimen, every other day. Plan was to do colonoscopy for surveillance. She does have 2 carotid stents in place and so plan was to hold her Plavix for 5 days prior to her procedure. Also wanted to do a 2-day prep. Colonoscopy April 2023 demonstrated decreased sphincter tone, small oozing polypoid lesion in the cecum which more likely represents an AVM with complete removal retrieval via hot snare, diverticulosis in the sigmoid and descending, and small nonbleeding internal hemorrhoids. EGD April 2023 demonstrated LA grade A esophagitis empiric dilation performed to 48 Turk[place], erosive gastropathy, normal duodenum. 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 pylori. Plan was to trial omeprazole 40 mg daily in the morning to help with reflux and dysphagia symptoms. She was lost to follow-up thereafter. Still doing Miralax/fiber, but having bloating, distention, poor appetite, having darker soft stools, as well as pressure in the pelvis. Continues to have manual need for evacuation, pelvic floor symptoms, sometimes loss of control. Weight is largely stable, on anxiety medication. Xanax prn, Lexapro. Was having ongoing symptoms of pelvic floor dysfunction, manual evacuation, and bloating/distention, which are somewhat chronic symptoms. Advised her to follow up with Dr. Chapa given cystocele and incontinence, to see if SNS would be an option for her. Increased fiber to twice daily, continue Miralax once daily, and add just once daily lubiprostone to assess effect. EGD 5/2024 with erosive gastropathy, as well as healed scarring in the antrum suggestive of prior ulceration. Treated with PPI and sucralfate. CT A/P with contrast 4/2024 negative for bowel inflammation, normal panc, no LAD. Advised FU with Eduard. She had COVID recently but has recovered from this. Still some fatigue, issues with BP, med changes going on. She has been doing well on sucralfate TID. Lubiprostone has helped, and only taking once daily, and going well to the bathroom. She has been on omeprazole as well. She has been omeprazole since 2023. She saw Eduard since last visit. She did have ER visit for elevated BPs in the 200s, and was ntoed to be hyponatremic, which then resolved with fluids. Decreased sucralfate to BID, lubiprostone once daily, omeprazole once daily. She is doing well on this regimen. No need for further testing. Follows with urogynecologist. Weight stable. She is still using fiber, but not as much Miralax due to lubiprostone effects which is fine. CBC, BMP more recently normal. She has had issues with BP and so is on higher dose BP medications at this point. Better controlled. On propranolol for tremors. Weight is down 6 lbs. HCTZ is lower due to sodium levels. Sometimes pedal edema. Has been on sucralfate to BID, lubiprostone once daily, omeprazole once daily. She is taking benefiber, and back on miralax. Not on lubiprostone. LV, she was not doing well from a bowel perspective, but had stopped miralax and lubiprostone because she ran out. Will start lubiprostone again once daily with fiber, and continue sucralfate BID. Otherwise, no alarm symptoms. Doing pretty well overall.  She is doing fiber, not miralax, and not lubiprostone. She does not do things daily because she is too runny. The lubiprostone works well for her. Not on extremis, doing well overall. She takes sucralfate BID. She was just started on plavix so no more omeprazole.

## 2025-05-09 ENCOUNTER — LAB OUTSIDE AN ENCOUNTER (OUTPATIENT)
Dept: URBAN - METROPOLITAN AREA CLINIC 7 | Facility: CLINIC | Age: 85
End: 2025-05-09

## 2025-05-09 ENCOUNTER — OFFICE VISIT (OUTPATIENT)
Dept: URBAN - METROPOLITAN AREA CLINIC 7 | Facility: CLINIC | Age: 85
End: 2025-05-09

## 2025-05-09 RX ORDER — VALSARTAN 320 MG/1
TAKE ONE TABLET BY MOUTH ONE TIME DAILY TABLET, FILM COATED ORAL
Qty: 90 UNSPECIFIED | Refills: 1 | COMMUNITY

## 2025-05-09 RX ORDER — ATORVASTATIN CALCIUM 20 MG/1
TABLET, FILM COATED ORAL
Qty: 90 TABLET | COMMUNITY

## 2025-05-09 RX ORDER — ESCITALOPRAM OXALATE 10 MG/1
TAKE ONE AND ONE-HALF TABLETS BY MOUTH ONE TIME DAILY TABLET ORAL
Qty: 135 UNSPECIFIED | Refills: 2 | COMMUNITY

## 2025-05-09 RX ORDER — HYDROCHLOROTHIAZIDE 25 MG/1
TABLET ORAL
Qty: 90 TABLET | COMMUNITY

## 2025-05-09 RX ORDER — HYDRALAZINE HYDROCHLORIDE 100 MG/1
1 TABLET WITH FOOD TABLET, FILM COATED ORAL THREE TIMES A DAY
Qty: 90 TABLET | COMMUNITY
Start: 2024-10-23

## 2025-05-09 RX ORDER — SUCRALFATE 1 G/1
1 TABLET ON AN EMPTY STOMACH TABLET ORAL TWICE A DAY
Qty: 60 | Refills: 11 | COMMUNITY
Start: 2024-05-02 | End: 2025-10-18

## 2025-05-09 RX ORDER — LEVOTHYROXINE SODIUM 50 UG/1
TABLET ORAL
Qty: 90 TABLET | COMMUNITY

## 2025-05-09 RX ORDER — PROPRANOLOL HYDROCHLORIDE 80 MG/1
TAKE ONE TABLET BY MOUTH ONE TIME DAILY TABLET ORAL
Qty: 90 UNSPECIFIED | Refills: 1 | COMMUNITY

## 2025-05-09 RX ORDER — OMEPRAZOLE 40 MG/1
TAKE ONE CAPSULE BY MOUTH ONE TIME DAILY FOR 90 DAYS CAPSULE, DELAYED RELEASE ORAL
Qty: 90 CAPSULE | Refills: 3 | COMMUNITY

## 2025-05-09 RX ORDER — LUBIPROSTONE 8 UG/1
1 CAPSULE WITH FOOD AND WATER CAPSULE, GELATIN COATED ORAL ONCE DAILY
Qty: 30 | Refills: 11 | COMMUNITY
Start: 2024-10-23 | End: 2025-10-18

## 2025-05-09 RX ORDER — CLOPIDOGREL 75 MG/1
1 TABLET TABLET, FILM COATED ORAL ONCE A DAY
COMMUNITY

## 2025-05-09 RX ORDER — FAMOTIDINE 20 MG/1
TAKE ONE TABLET BY MOUTH TWICE A DAY TABLET, FILM COATED ORAL
Qty: 180 UNSPECIFIED | Refills: 1 | COMMUNITY

## 2025-05-09 RX ORDER — ALPRAZOLAM 0.5 MG/1
TABLET ORAL
Qty: 60 TABLET | COMMUNITY

## 2025-05-09 RX ORDER — METHOCARBAMOL 750 MG/1
1 TABLET TABLET ORAL
COMMUNITY

## 2025-06-27 ENCOUNTER — ERX REFILL RESPONSE (OUTPATIENT)
Dept: URBAN - METROPOLITAN AREA CLINIC 7 | Facility: CLINIC | Age: 85
End: 2025-06-27

## 2025-06-27 RX ORDER — OMEPRAZOLE 40 MG/1
TAKE ONE CAPSULE BY MOUTH ONE TIME DAILY CAPSULE, DELAYED RELEASE ORAL
Qty: 90 CAPSULE | Refills: 3

## 2025-07-03 ENCOUNTER — OFFICE VISIT (OUTPATIENT)
Dept: URBAN - METROPOLITAN AREA CLINIC 7 | Facility: CLINIC | Age: 85
End: 2025-07-03

## 2025-08-25 ENCOUNTER — OFFICE VISIT (OUTPATIENT)
Dept: URBAN - METROPOLITAN AREA CLINIC 7 | Facility: CLINIC | Age: 85
End: 2025-08-25
Payer: MEDICARE

## 2025-08-25 DIAGNOSIS — K58.1 IRRITABLE BOWEL SYNDROME WITH CONSTIPATION: ICD-10-CM

## 2025-08-25 DIAGNOSIS — Z86.0100 PERSONAL HISTORY OF COLON POLYPS, UNSPECIFIED: ICD-10-CM

## 2025-08-25 DIAGNOSIS — R10.30 LOWER ABDOMINAL PAIN: ICD-10-CM

## 2025-08-25 DIAGNOSIS — K21.9 GASTROESOPHAGEAL REFLUX DISEASE: ICD-10-CM

## 2025-08-25 DIAGNOSIS — M62.89 PELVIC FLOOR DYSFUNCTION: ICD-10-CM

## 2025-08-25 DIAGNOSIS — N81.10 FEMALE CYSTOCELE: ICD-10-CM

## 2025-08-25 DIAGNOSIS — K92.1 MELENA: ICD-10-CM

## 2025-08-25 DIAGNOSIS — K59.00 CONSTIPATION: ICD-10-CM

## 2025-08-25 DIAGNOSIS — R15.0 INCOMPLETE DEFECATION: ICD-10-CM

## 2025-08-25 DIAGNOSIS — R10.9 ABDOMINAL PAIN: ICD-10-CM

## 2025-08-25 DIAGNOSIS — E03.9 HYPOTHYROIDISM, UNSPECIFIED TYPE: ICD-10-CM

## 2025-08-25 PROCEDURE — 99214 OFFICE O/P EST MOD 30 MIN: CPT | Performed by: INTERNAL MEDICINE

## 2025-08-25 RX ORDER — VALSARTAN 320 MG/1
TAKE ONE TABLET BY MOUTH ONE TIME DAILY TABLET, FILM COATED ORAL
Qty: 90 UNSPECIFIED | Refills: 1 | Status: ACTIVE | COMMUNITY

## 2025-08-25 RX ORDER — LEVOTHYROXINE SODIUM 50 UG/1
TABLET ORAL
Qty: 90 TABLET | Status: ACTIVE | COMMUNITY

## 2025-08-25 RX ORDER — FLUTICASONE PROPIONATE 50 UG/1
1 SPRAY IN EACH NOSTRIL SPRAY, METERED NASAL TWICE A DAY
Status: ACTIVE | COMMUNITY

## 2025-08-25 RX ORDER — SUCRALFATE 1 G/1
1 TABLET ON AN EMPTY STOMACH TABLET ORAL TWICE A DAY
Status: ACTIVE | COMMUNITY

## 2025-08-25 RX ORDER — TOPIRAMATE 25 MG/ML
1 ML SOLUTION ORAL ONCE A DAY
Status: ACTIVE | COMMUNITY

## 2025-08-25 RX ORDER — LUBIPROSTONE 8 UG/1
1 CAPSULE WITH FOOD AND WATER CAPSULE, GELATIN COATED ORAL ONCE DAILY
Qty: 30 | Refills: 11 | Status: ACTIVE | COMMUNITY
Start: 2024-10-23 | End: 2025-10-18

## 2025-08-25 RX ORDER — PRIMIDONE 50 MG/1
1 TABLET TABLET ORAL ONCE A DAY
Status: ACTIVE | COMMUNITY

## 2025-08-25 RX ORDER — CLOPIDOGREL 75 MG/1
1 TABLET TABLET, FILM COATED ORAL ONCE A DAY
Status: ACTIVE | COMMUNITY

## 2025-08-25 RX ORDER — ESTRADIOL 0.1 MG/G
AS DIRECTED CREAM VAGINAL
Status: ACTIVE | COMMUNITY

## 2025-08-25 RX ORDER — ESCITALOPRAM OXALATE 10 MG/1
TAKE ONE AND ONE-HALF TABLETS BY MOUTH ONE TIME DAILY TABLET ORAL
Qty: 135 UNSPECIFIED | Refills: 2 | Status: ACTIVE | COMMUNITY

## 2025-08-25 RX ORDER — ATORVASTATIN CALCIUM 40 MG/1
1 TABLET TABLET, FILM COATED ORAL ONCE A DAY
Qty: 90 TABLET | Status: ACTIVE | COMMUNITY

## 2025-08-25 RX ORDER — METRONIDAZOLE 250 MG/1
1 TABLET TABLET ORAL THREE TIMES A DAY
Qty: 30 | Refills: 0 | OUTPATIENT
Start: 2025-08-25 | End: 2025-09-04

## 2025-08-25 RX ORDER — OMEPRAZOLE 40 MG/1
TAKE ONE CAPSULE BY MOUTH ONE TIME DAILY CAPSULE, DELAYED RELEASE ORAL
Qty: 90 CAPSULE | Refills: 3 | Status: ACTIVE | COMMUNITY

## 2025-08-25 RX ORDER — ALPRAZOLAM 0.5 MG/1
TABLET ORAL
Qty: 60 TABLET | Status: ACTIVE | COMMUNITY

## 2025-08-25 RX ORDER — HYDRALAZINE HYDROCHLORIDE 100 MG/1
1 TABLET WITH FOOD TABLET, FILM COATED ORAL THREE TIMES A DAY
Qty: 90 TABLET | Status: ACTIVE | COMMUNITY
Start: 2024-10-23

## (undated) LAB
BLOOD COUNT, PLATELET, AUTOMATED: (no result)
FERRITIN: (no result)
GRANULOCYTE %: (no result)
HCT (HEMATOCRIT): (no result)
HGB (HEMOGLOBIN): (no result)
IRON BINDING CAPACITY (TIBC): (no result)
IRON: (no result)
LYMPHOCYTES, TOTAL: (no result)
MCH (MEAN CORPUSCULAR HEMOGLOBIN): (no result)
MCHC (MEAN CORPUSCULAR HEMOGLOBIN CONCENTRATI: (no result)
MCV (MEAN CORPUSCULAR VOLUME): (no result)
MONOCYTES: (no result)
RBC: (no result)
WBC: (no result)